# Patient Record
Sex: MALE | Race: WHITE | NOT HISPANIC OR LATINO | Employment: STUDENT | URBAN - METROPOLITAN AREA
[De-identification: names, ages, dates, MRNs, and addresses within clinical notes are randomized per-mention and may not be internally consistent; named-entity substitution may affect disease eponyms.]

---
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
## 2020-01-03 ENCOUNTER — OFFICE VISIT (OUTPATIENT)
Dept: INTERNAL MEDICINE CLINIC | Facility: CLINIC | Age: 20
End: 2020-01-03
Payer: COMMERCIAL

## 2020-01-03 VITALS
WEIGHT: 182 LBS | DIASTOLIC BLOOD PRESSURE: 78 MMHG | OXYGEN SATURATION: 96 % | SYSTOLIC BLOOD PRESSURE: 118 MMHG | HEART RATE: 76 BPM | BODY MASS INDEX: 23.36 KG/M2 | TEMPERATURE: 98.9 F | HEIGHT: 74 IN

## 2020-01-03 DIAGNOSIS — L24.9 IRRITANT CONTACT DERMATITIS, UNSPECIFIED TRIGGER: Primary | ICD-10-CM

## 2020-01-03 PROCEDURE — 3008F BODY MASS INDEX DOCD: CPT | Performed by: INTERNAL MEDICINE

## 2020-01-03 PROCEDURE — 99213 OFFICE O/P EST LOW 20 MIN: CPT | Performed by: INTERNAL MEDICINE

## 2020-01-03 RX ORDER — MOMETASONE FUROATE 1 MG/G
CREAM TOPICAL DAILY
Qty: 45 G | Refills: 0 | Status: SHIPPED | OUTPATIENT
Start: 2020-01-03 | End: 2022-06-08

## 2020-01-03 NOTE — PROGRESS NOTES
Assessment/Plan:     Diagnoses and all orders for this visit:    Irritant contact dermatitis, unspecified trigger  -     mometasone (ELOCON) 0 1 % cream; Apply topically daily          Subjective:      Patient ID: Buffy Finch is a 23 y o  male  HPI  Navjot Rain is here today with his mother with a 2 week history of rubbing his left lower eyelid, and then noticing some redness, and crusting of the skin, unresponsive to deep iris in weight min and acyclovir ointment, no alteration of vision, no eye pain, and slight improvement with applied Lubriderm, and stopping the topical preparations  His presentation today was consistent with contact dermatitis, and plan is to continue Lubriderm and avoid acyclovir and higher skin ointments and a trial of mometasone cream to be applied extremely sparingly, never to the eye itself, twice a day and call back in 3 days regarding response  The following portions of the patient's history were reviewed and updated as appropriate: allergies, current medications, past family history, past medical history, past social history, past surgical history and problem list     Review of Systems      Objective:      /78 (BP Location: Left arm, Patient Position: Sitting, Cuff Size: Standard)   Pulse 76   Temp 98 9 °F (37 2 °C) (Tympanic)   Ht 6' 2" (1 88 m)   Wt 82 6 kg (182 lb)   SpO2 96%   BMI 23 37 kg/m²      Wt Readings from Last 3 Encounters:   01/03/20 82 6 kg (182 lb) (84 %, Z= 0 98)*   04/09/19 80 3 kg (177 lb) (82 %, Z= 0 93)*   01/17/19 78 7 kg (173 lb 6 4 oz) (80 %, Z= 0 85)*     * Growth percentiles are based on CDC (Boys, 2-20 Years) data       Temp Readings from Last 3 Encounters:   01/03/20 98 9 °F (37 2 °C) (Tympanic)   04/09/19 97 9 °F (36 6 °C) (Tympanic)   01/17/19 98 7 °F (37 1 °C) (Tympanic)     BP Readings from Last 3 Encounters:   01/03/20 118/78   04/09/19 100/72   01/17/19 120/60     Pulse Readings from Last 3 Encounters:   01/03/20 76   04/09/19 71 01/17/19 71        Physical Exam    Vital signs stable with normal visual acuity and exam of both eyes demonstrates no abnormality  The left lower eyelid has a well-circumscribed area of contact dermatitis with no evidence of secondary infection  No other areas involved are noted  The area is raised, slightly indurated, slightly red and there is some exfoliation  There is well circumscribed  There is no cellulitis  Patient Active Problem List   Diagnosis    Mild exercise-induced asthma    Acne vulgaris    Environmental allergies       Current Outpatient Medications on File Prior to Visit   Medication Sig Dispense Refill    acyclovir (ZOVIRAX) 5 % ointment Apply twice daily to affected area 15 g 0    mupirocin (BACTROBAN) 2 % ointment Apply topically 2 (two) times a day for 10 days 22 g 0     No current facility-administered medications on file prior to visit

## 2021-03-10 DIAGNOSIS — Z23 ENCOUNTER FOR IMMUNIZATION: ICD-10-CM

## 2021-03-11 ENCOUNTER — IMMUNIZATIONS (OUTPATIENT)
Dept: FAMILY MEDICINE CLINIC | Facility: HOSPITAL | Age: 21
End: 2021-03-11

## 2021-03-11 DIAGNOSIS — Z23 ENCOUNTER FOR IMMUNIZATION: Primary | ICD-10-CM

## 2021-03-11 PROCEDURE — 91300 SARS-COV-2 / COVID-19 MRNA VACCINE (PFIZER-BIONTECH) 30 MCG: CPT

## 2021-03-11 PROCEDURE — 0001A SARS-COV-2 / COVID-19 MRNA VACCINE (PFIZER-BIONTECH) 30 MCG: CPT

## 2021-04-01 ENCOUNTER — IMMUNIZATIONS (OUTPATIENT)
Dept: FAMILY MEDICINE CLINIC | Facility: HOSPITAL | Age: 21
End: 2021-04-01

## 2021-04-01 DIAGNOSIS — Z23 ENCOUNTER FOR IMMUNIZATION: Primary | ICD-10-CM

## 2021-04-01 PROCEDURE — 91300 SARS-COV-2 / COVID-19 MRNA VACCINE (PFIZER-BIONTECH) 30 MCG: CPT

## 2021-04-01 PROCEDURE — 0002A SARS-COV-2 / COVID-19 MRNA VACCINE (PFIZER-BIONTECH) 30 MCG: CPT

## 2022-01-13 ENCOUNTER — APPOINTMENT (OUTPATIENT)
Dept: LAB | Facility: MEDICAL CENTER | Age: 22
End: 2022-01-13
Payer: COMMERCIAL

## 2022-01-13 DIAGNOSIS — J11.1 INFLUENZAL ACUTE UPPER RESPIRATORY INFECTION: ICD-10-CM

## 2022-01-13 DIAGNOSIS — J02.9 ACUTE PHARYNGITIS, UNSPECIFIED ETIOLOGY: ICD-10-CM

## 2022-01-13 DIAGNOSIS — U07.1 COVID-19 VIRUS INFECTION: ICD-10-CM

## 2022-01-13 LAB
ALBUMIN SERPL BCP-MCNC: 3.9 G/DL (ref 3.5–5)
ALP SERPL-CCNC: 50 U/L (ref 46–116)
ALT SERPL W P-5'-P-CCNC: 23 U/L (ref 12–78)
ANION GAP SERPL CALCULATED.3IONS-SCNC: 3 MMOL/L (ref 4–13)
AST SERPL W P-5'-P-CCNC: 11 U/L (ref 5–45)
BASOPHILS # BLD AUTO: 0.09 THOUSANDS/ΜL (ref 0–0.1)
BASOPHILS NFR BLD AUTO: 1 % (ref 0–1)
BILIRUB SERPL-MCNC: 1.1 MG/DL (ref 0.2–1)
BUN SERPL-MCNC: 10 MG/DL (ref 5–25)
CALCIUM SERPL-MCNC: 9.6 MG/DL (ref 8.3–10.1)
CHLORIDE SERPL-SCNC: 105 MMOL/L (ref 100–108)
CO2 SERPL-SCNC: 29 MMOL/L (ref 21–32)
CREAT SERPL-MCNC: 1.01 MG/DL (ref 0.6–1.3)
EOSINOPHIL # BLD AUTO: 0.13 THOUSAND/ΜL (ref 0–0.61)
EOSINOPHIL NFR BLD AUTO: 1 % (ref 0–6)
ERYTHROCYTE [DISTWIDTH] IN BLOOD BY AUTOMATED COUNT: 13 % (ref 11.6–15.1)
GFR SERPL CREATININE-BSD FRML MDRD: 105 ML/MIN/1.73SQ M
GLUCOSE P FAST SERPL-MCNC: 82 MG/DL (ref 65–99)
HCT VFR BLD AUTO: 43.7 % (ref 36.5–49.3)
HGB BLD-MCNC: 14.8 G/DL (ref 12–17)
IMM GRANULOCYTES # BLD AUTO: 0.06 THOUSAND/UL (ref 0–0.2)
IMM GRANULOCYTES NFR BLD AUTO: 1 % (ref 0–2)
LYMPHOCYTES # BLD AUTO: 2 THOUSANDS/ΜL (ref 0.6–4.47)
LYMPHOCYTES NFR BLD AUTO: 16 % (ref 14–44)
MCH RBC QN AUTO: 29.7 PG (ref 26.8–34.3)
MCHC RBC AUTO-ENTMCNC: 33.9 G/DL (ref 31.4–37.4)
MCV RBC AUTO: 88 FL (ref 82–98)
MONOCYTES # BLD AUTO: 0.58 THOUSAND/ΜL (ref 0.17–1.22)
MONOCYTES NFR BLD AUTO: 5 % (ref 4–12)
NEUTROPHILS # BLD AUTO: 10.01 THOUSANDS/ΜL (ref 1.85–7.62)
NEUTS SEG NFR BLD AUTO: 76 % (ref 43–75)
NRBC BLD AUTO-RTO: 0 /100 WBCS
PLATELET # BLD AUTO: 316 THOUSANDS/UL (ref 149–390)
PMV BLD AUTO: 8.7 FL (ref 8.9–12.7)
POTASSIUM SERPL-SCNC: 4.1 MMOL/L (ref 3.5–5.3)
PROT SERPL-MCNC: 8.2 G/DL (ref 6.4–8.2)
RBC # BLD AUTO: 4.99 MILLION/UL (ref 3.88–5.62)
SODIUM SERPL-SCNC: 137 MMOL/L (ref 136–145)
TSH SERPL DL<=0.05 MIU/L-ACNC: 2.87 UIU/ML (ref 0.36–3.74)
WBC # BLD AUTO: 12.87 THOUSAND/UL (ref 4.31–10.16)

## 2022-01-13 PROCEDURE — 80053 COMPREHEN METABOLIC PANEL: CPT

## 2022-01-13 PROCEDURE — 85025 COMPLETE CBC W/AUTO DIFF WBC: CPT

## 2022-01-13 PROCEDURE — 36415 COLL VENOUS BLD VENIPUNCTURE: CPT

## 2022-01-13 PROCEDURE — 84443 ASSAY THYROID STIM HORMONE: CPT

## 2022-06-07 ENCOUNTER — APPOINTMENT (OUTPATIENT)
Dept: LAB | Facility: MEDICAL CENTER | Age: 22
End: 2022-06-07
Payer: COMMERCIAL

## 2022-06-07 DIAGNOSIS — R53.83 OTHER FATIGUE: ICD-10-CM

## 2022-06-07 LAB
ALBUMIN SERPL BCP-MCNC: 4.6 G/DL (ref 3.5–5)
ALP SERPL-CCNC: 34 U/L (ref 46–116)
ALT SERPL W P-5'-P-CCNC: 30 U/L (ref 12–78)
ANION GAP SERPL CALCULATED.3IONS-SCNC: 3 MMOL/L (ref 4–13)
AST SERPL W P-5'-P-CCNC: 19 U/L (ref 5–45)
BASOPHILS # BLD AUTO: 0.05 THOUSANDS/ΜL (ref 0–0.1)
BASOPHILS # BLD MANUAL: 0 THOUSAND/UL (ref 0–0.1)
BASOPHILS NFR BLD AUTO: 1 % (ref 0–1)
BILIRUB SERPL-MCNC: 1.37 MG/DL (ref 0.2–1)
BUN SERPL-MCNC: 12 MG/DL (ref 5–25)
CALCIUM SERPL-MCNC: 9.8 MG/DL (ref 8.3–10.1)
CHLORIDE SERPL-SCNC: 107 MMOL/L (ref 100–108)
CO2 SERPL-SCNC: 28 MMOL/L (ref 21–32)
CREAT SERPL-MCNC: 1.1 MG/DL (ref 0.6–1.3)
EOSINOPHIL # BLD AUTO: 0.04 THOUSAND/ΜL (ref 0–0.61)
EOSINOPHIL # BLD MANUAL: 0.04 THOUSAND/UL (ref 0–0.4)
EOSINOPHIL NFR BLD AUTO: 1 % (ref 0–6)
EOSINOPHIL NFR BLD MANUAL: 1 % (ref 0–6)
ERYTHROCYTE [DISTWIDTH] IN BLOOD BY AUTOMATED COUNT: 12.6 % (ref 11.6–15.1)
FERRITIN SERPL-MCNC: 115 NG/ML (ref 8–388)
GFR SERPL CREATININE-BSD FRML MDRD: 95 ML/MIN/1.73SQ M
GLUCOSE SERPL-MCNC: 91 MG/DL (ref 65–140)
HCT VFR BLD AUTO: 42.5 % (ref 36.5–49.3)
HGB BLD-MCNC: 15.3 G/DL (ref 12–17)
IMM GRANULOCYTES # BLD AUTO: 0.02 THOUSAND/UL (ref 0–0.2)
IMM GRANULOCYTES NFR BLD AUTO: 0 % (ref 0–2)
LYMPHOCYTES # BLD AUTO: 1.74 THOUSAND/UL (ref 0.6–4.47)
LYMPHOCYTES # BLD AUTO: 1.74 THOUSANDS/ΜL (ref 0.6–4.47)
LYMPHOCYTES # BLD AUTO: 22 % (ref 14–44)
LYMPHOCYTES NFR BLD AUTO: 23 % (ref 14–44)
MCH RBC QN AUTO: 31 PG (ref 26.8–34.3)
MCHC RBC AUTO-ENTMCNC: 36 G/DL (ref 31.4–37.4)
MCV RBC AUTO: 86 FL (ref 82–98)
MONOCYTES # BLD AUTO: 0.6 THOUSAND/UL (ref 0–1.22)
MONOCYTES # BLD AUTO: 0.6 THOUSAND/ΜL (ref 0.17–1.22)
MONOCYTES NFR BLD AUTO: 8 % (ref 4–12)
MONOCYTES NFR BLD: 8 % (ref 4–12)
NEUTROPHILS # BLD AUTO: 5.3 THOUSANDS/ΜL (ref 1.85–7.62)
NEUTROPHILS # BLD MANUAL: 5.3 THOUSAND/UL (ref 1.85–7.62)
NEUTS SEG NFR BLD AUTO: 67 % (ref 43–75)
NEUTS SEG NFR BLD AUTO: 69 % (ref 43–75)
NRBC BLD AUTO-RTO: 0 /100 WBCS
PLATELET # BLD AUTO: 247 THOUSANDS/UL (ref 149–390)
PLATELET BLD QL SMEAR: ADEQUATE
PMV BLD AUTO: 9.2 FL (ref 8.9–12.7)
POTASSIUM SERPL-SCNC: 4.2 MMOL/L (ref 3.5–5.3)
PROT SERPL-MCNC: 7.7 G/DL (ref 6.4–8.2)
RBC # BLD AUTO: 4.93 MILLION/UL (ref 3.88–5.62)
RBC MORPH BLD: NORMAL
SODIUM SERPL-SCNC: 138 MMOL/L (ref 136–145)
TOTAL CELLS COUNTED SPEC: 100
WBC # BLD AUTO: 7.75 THOUSAND/UL (ref 4.31–10.16)

## 2022-06-07 PROCEDURE — 85025 COMPLETE CBC W/AUTO DIFF WBC: CPT

## 2022-06-07 PROCEDURE — 80053 COMPREHEN METABOLIC PANEL: CPT

## 2022-06-07 PROCEDURE — 82728 ASSAY OF FERRITIN: CPT

## 2022-06-07 PROCEDURE — 85027 COMPLETE CBC AUTOMATED: CPT

## 2022-06-07 PROCEDURE — 85007 BL SMEAR W/DIFF WBC COUNT: CPT

## 2022-06-08 ENCOUNTER — OFFICE VISIT (OUTPATIENT)
Dept: INTERNAL MEDICINE CLINIC | Facility: CLINIC | Age: 22
End: 2022-06-08
Payer: COMMERCIAL

## 2022-06-08 VITALS
HEIGHT: 74 IN | WEIGHT: 181 LBS | RESPIRATION RATE: 16 BRPM | HEART RATE: 81 BPM | OXYGEN SATURATION: 98 % | TEMPERATURE: 96 F | BODY MASS INDEX: 23.23 KG/M2 | DIASTOLIC BLOOD PRESSURE: 80 MMHG | SYSTOLIC BLOOD PRESSURE: 118 MMHG

## 2022-06-08 DIAGNOSIS — R40.0 DAYTIME SOMNOLENCE: ICD-10-CM

## 2022-06-08 DIAGNOSIS — R10.13 DYSPEPSIA: ICD-10-CM

## 2022-06-08 DIAGNOSIS — J34.2 ACQUIRED DEVIATED NASAL SEPTUM: Primary | ICD-10-CM

## 2022-06-08 DIAGNOSIS — R53.83 FATIGUE, UNSPECIFIED TYPE: ICD-10-CM

## 2022-06-08 DIAGNOSIS — Z91.09 ENVIRONMENTAL ALLERGIES: ICD-10-CM

## 2022-06-08 DIAGNOSIS — J45.990 MILD EXERCISE-INDUCED ASTHMA: Primary | ICD-10-CM

## 2022-06-08 PROBLEM — L70.0 ACNE VULGARIS: Status: RESOLVED | Noted: 2018-10-28 | Resolved: 2022-06-08

## 2022-06-08 PROCEDURE — 99214 OFFICE O/P EST MOD 30 MIN: CPT | Performed by: INTERNAL MEDICINE

## 2022-06-08 PROCEDURE — 1036F TOBACCO NON-USER: CPT | Performed by: INTERNAL MEDICINE

## 2022-06-08 PROCEDURE — 3008F BODY MASS INDEX DOCD: CPT | Performed by: INTERNAL MEDICINE

## 2022-06-08 RX ORDER — IPRATROPIUM BROMIDE 42 UG/1
2 SPRAY, METERED NASAL 3 TIMES DAILY PRN
COMMUNITY
Start: 2022-04-05 | End: 2022-09-20

## 2022-06-08 RX ORDER — GUAIFENESIN, PSEUDOEPHEDRINE HYDROCHLORIDE 600; 60 MG/1; MG/1
1 TABLET, EXTENDED RELEASE ORAL EVERY 12 HOURS
COMMUNITY
Start: 2022-01-13 | End: 2022-06-08

## 2022-06-08 RX ORDER — ALBUTEROL SULFATE 90 UG/1
2 AEROSOL, METERED RESPIRATORY (INHALATION) EVERY 6 HOURS PRN
COMMUNITY
Start: 2022-05-09

## 2022-06-08 NOTE — PROGRESS NOTES
INTERNAL MEDICINE OFFICE VISIT       NAME: Celeste Vazquez  AGE: 24 y o  SEX: male       : 2000        MRN: [id number]    DATE: 2022  TIME: 3:26 PM    Assessment and Plan   1  Acquired deviated nasal septum  -     Ambulatory Referral to Otolaryngology; Future    2  Fatigue, unspecified type  -     Ambulatory Referral to Sleep Medicine; Future  -     TSH, 3rd generation  -     T4, free  -     t-Transglutaminase (tTG) IgG; Future    3  Daytime somnolence  -     Ambulatory Referral to Sleep Medicine; Future    4  Dyspepsia         I will contact Compa Coyle with the results of his upcoming lab work  If unrevealing, will proceed with workup with Gastroenterology for nonulcer dyspepsia  Will also order CT of the abdomen and pelvis with oral contrast   Start Pepcid 20 mg twice daily and stop Pepto-Bismol  Chief Complaint   Multiple concerns    History of Present Illness   Celeste Vazquez is a 24y o -year-old male who is here today to reestablish care and I did see Compa Coyle several years ago  He is here today with his mother  He has completed his nelli year in college and 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  Overall his health has been good in the past     There several issues he wished to discuss  First, he notes years of inability to breathe well through his nose, constant mucus production and some sinus pressure  He has tried over-the-counter nasal sprays and antihistamines without relief  His mother recalls that he had nasal trauma around age 6 or 5 and since then has had deformity of the nose and trouble breathing  Compa Coyle confirms this  Second, over the last 2 months or so, he has noticed general fatigue and dyspepsia described as a feeling of nausea associated with eating  There is no abdominal pain, some slight anorexia, no change in bowel habits, no history of GI illness in the past   This includes no history of inflammatory bowel disease or peptic ulcers disease    There has never been any gastrointestinal surgery  We reviewed yesterday's lab work which is unrevealing and labs from over the past year which are unrevealing  There has never been a testing for celiac disease  Zhane Stewart notes that when he went to college in the beginning of last semester he lost about 30 lb because he did not like the food  His weight has since stabilized  Zhane Stewart is also no set pattern of daytime somnolence over last 6 months, and we discussed any noticeable sleep disorder  He does not feel that he wakes up frequently at night nor does he necessarily notice any problems with not feeling rested when he wakes up  He does not use drugs or drink alcohol to any significant degree, no significant caffeine, no over-the-counter medicines regularly  He did have COVID in January this year made a full recovery with minor symptoms  Discussed the possible interplay between his obvious nasal septal deviation and impaired nasal breathing, with chronic mouth breathing, and possible sleep disorder  Plan is ENT evaluation and sleep evaluation  Regarding unexplained weight loss with anorexia and dyspepsia, with fatigue, will check for celiac disease and recheck thyroid function tests as per patient request   We discussed that I anticipate need for full GI workup  Review of Systems   Review of Systems   Constitutional: Positive for appetite change, fatigue and unexpected weight change  Negative for activity change, chills and fever  Respiratory: Negative  Cardiovascular: Negative  Gastrointestinal: Positive for nausea  Negative for abdominal distention, abdominal pain, anal bleeding, blood in stool, constipation, diarrhea, rectal pain and vomiting  Endocrine: Negative for cold intolerance and heat intolerance  Psychiatric/Behavioral: Negative           Active Problem List     Patient Active Problem List   Diagnosis    Mild exercise-induced asthma    Environmental allergies    Acquired deviated nasal septum       The following portions of the patient's history were reviewed and updated as appropriate: allergies, current medications, past family history, past medical history, past social history, past surgical history, and problem list     Objective     Vitals:    06/08/22 1408   BP: 118/80   Pulse: 81   Resp: 16   Temp: (!) 96 °F (35 6 °C)   SpO2: 98%     Wt Readings from Last 3 Encounters:   06/08/22 82 1 kg (181 lb)   01/03/20 82 6 kg (182 lb) (84 %, Z= 0 98)*   04/09/19 80 3 kg (177 lb) (82 %, Z= 0 93)*     * Growth percentiles are based on CDC (Boys, 2-20 Years) data  Physical Exam     VSS, afebrile, pulse regular    Alert and Oriented in NAD    HEENT: NCAT, Pupils equal, 3 mm, EOEMI, no icterus or pallor, no iritis or conjunctivitis  No head or neck mass or adenopathy  Ears:  normal-appearing external auditory canals and tympanic membranes,     Nose:  There is obvious nasal deformity, including proximal nasal bridge protrusion, obvious septal deviation to the left, some mild nasal membranes swelling with clear mucus in both nasal passages with occlusion of left nasal passage  There is no fullness over the sinuses  Oral cavity and throat: normal dentition, no gum disease, normal mucosa, patent airway, no hemorrhages or exudates in the throat  Exam of salivary glands and ducts are normal  No submandibular or submental adenopathy  Tonsils are prominent but airways are patent  There is no tonsillitis  He does breathe through his mouth  Neck: supple, no trauma or tenderness  No JVD  Normal carotid upstroke and descent without bruits  Trachea midline without stridor  Thyroid exam normal on inspection and palpation  No spinal tenderness, full range of motion  Lymphatics: no adenopathy throughout    Chest:  No trauma or deformity, no supraclavicular adenopathy or bruit  Chest wall expansion is symmetric and normal, expiratory phase is not prolonged      Heart:  Regular rate and rhythm, normal T9-H0, no X8-D7, no clicks murmurs or rubs  Lungs:  Clear to auscultation and normal to percussion  Back:  No trauma or deformity, no CVA mass or CVA tenderness  Skin:  No rash or skin malignancy  Abdomen:  Nondistended, normal bowel sounds, soft nontender without masses bruits organomegaly  There is no hernia  There are no surgical scars  Extremities:  Arterial circulation is symmetrically normal with no clubbing cyanosis or edema  There are no varicosities, there is no calf tenderness or phlebitic findings  Joints:  No deformity, swelling, redness, tenderness or increased temperature, no instability  There are no tophi  Affect:  Normal    Neurologic:  Normal and stable gait, and otherwise no focal findings as well  Cognitive: Intact  Pertinent Laboratory/Diagnostic Studies:        Orders Placed This Encounter   Procedures    TSH, 3rd generation    T4, free    t-Transglutaminase (tTG) IgG    Ambulatory Referral to Otolaryngology    Ambulatory Referral to Sleep Medicine       ALLERGIES:  No Known Allergies    Current Medications     Current Outpatient Medications   Medication Sig Dispense Refill    albuterol (PROVENTIL HFA,VENTOLIN HFA) 90 mcg/act inhaler Inhale 2 puffs every 6 (six) hours as needed      ipratropium (ATROVENT) 0 06 % nasal spray 2 sprays into each nostril 3 (three) times a day as needed       No current facility-administered medications for this visit           Yan Irvin MD

## 2022-06-16 ENCOUNTER — LAB (OUTPATIENT)
Dept: LAB | Facility: MEDICAL CENTER | Age: 22
End: 2022-06-16
Payer: COMMERCIAL

## 2022-06-16 DIAGNOSIS — R53.83 FATIGUE, UNSPECIFIED TYPE: ICD-10-CM

## 2022-06-16 LAB
T4 FREE SERPL-MCNC: 0.85 NG/DL (ref 0.76–1.46)
TSH SERPL DL<=0.05 MIU/L-ACNC: 2.41 UIU/ML (ref 0.45–4.5)

## 2022-06-16 PROCEDURE — 84439 ASSAY OF FREE THYROXINE: CPT | Performed by: INTERNAL MEDICINE

## 2022-06-16 PROCEDURE — 86364 TISS TRNSGLTMNASE EA IG CLAS: CPT

## 2022-06-16 PROCEDURE — 84443 ASSAY THYROID STIM HORMONE: CPT | Performed by: INTERNAL MEDICINE

## 2022-06-16 PROCEDURE — 36415 COLL VENOUS BLD VENIPUNCTURE: CPT | Performed by: INTERNAL MEDICINE

## 2022-06-18 LAB — TTG IGG SER-ACNC: <2 U/ML (ref 0–5)

## 2022-06-20 NOTE — RESULT ENCOUNTER NOTE
Ar Ham,  Your labs are unremarkable overall  The low anion gap and alkaline phosphatase are due to problems with our chemistry analyzer  The next step is to see the Sleep Specialist  I will review the note from the Specialist to follow along     Dr Murrell Stamp

## 2022-08-17 ENCOUNTER — OFFICE VISIT (OUTPATIENT)
Dept: SLEEP CENTER | Facility: CLINIC | Age: 22
End: 2022-08-17
Payer: COMMERCIAL

## 2022-08-17 VITALS
SYSTOLIC BLOOD PRESSURE: 126 MMHG | BODY MASS INDEX: 24.64 KG/M2 | HEART RATE: 82 BPM | DIASTOLIC BLOOD PRESSURE: 57 MMHG | HEIGHT: 74 IN | WEIGHT: 192 LBS

## 2022-08-17 DIAGNOSIS — R35.1 NOCTURIA: ICD-10-CM

## 2022-08-17 DIAGNOSIS — G47.33 OBSTRUCTIVE SLEEP APNEA: Primary | ICD-10-CM

## 2022-08-17 DIAGNOSIS — J35.1 CHRONIC TONSILLAR HYPERTROPHY: ICD-10-CM

## 2022-08-17 DIAGNOSIS — G47.00 INSOMNIA, UNSPECIFIED TYPE: ICD-10-CM

## 2022-08-17 DIAGNOSIS — G47.19 EXCESSIVE DAYTIME SLEEPINESS: ICD-10-CM

## 2022-08-17 DIAGNOSIS — R53.83 FATIGUE, UNSPECIFIED TYPE: ICD-10-CM

## 2022-08-17 PROCEDURE — 99244 OFF/OP CNSLTJ NEW/EST MOD 40: CPT | Performed by: NURSE PRACTITIONER

## 2022-08-17 NOTE — PATIENT INSTRUCTIONS
Schedule home sleep study  Follow up to review results and plan of treatment      Nursing Support:  When: Monday through Friday 7A-5PM except holidays  Where: Our direct line is [phone]  If you are having a true emergency please call 911  In the event that the line is busy or it is after hours please leave a voice message and we will return your call  Please speak clearly, leaving your full name, birth date, best number to reach you and the reason for your call  Medication refills: We will need the name of the medication, the dosage, the ordering provider, whether you get a 30 or 90 day refill, and the pharmacy name and address  Medications will be ordered by the provider only  Nurses cannot call in prescriptions  Please allow 7 days for medication refills  Physician requested updates: If your provider requested that you call with an update after starting medication, please be ready to provide us the medication and dosage, what time you take your medication, the time you attempt to fall asleep, time you fall asleep, when you wake up, and what time you get out of bed  Sleep Study Results: We will contact you with sleep study results and/or next steps after the physician has reviewed your testing

## 2022-08-17 NOTE — PROGRESS NOTES
Consultation - [address] [facility], 2000, MRN: [id number]    8/17/2022        Reason for Consult / Principal Problem:  Excessive daytime sleepiness  Fatigue  Evaluation of possible Obstructive Sleep Apnea       Thank you for the opportunity of participating in the evaluation and care of this patient [facility]  Subjective:     HPI: Manuela Simental is a 24y o  year old male  He presents with his mother for a consultation regarding excessive daytime sleepiness and fatigue  He has noticed an increase and worsening of daytime sleepiness and fatigue over the past year  He reports that he had Covid-19 in fall of 2021  He then felt that he may have had Covid-19 again in the spring, however, it was not confirmed by testing  He also had influenza A in January 2022  He noticed symptoms of feeling tired and not as refreshed by sleep throughout the spring months  He discussed symptoms with his PCP  He was referred to ENT for evaluation of a deviated septum  He was found to have enlarged tonsils and adenoids  He was advised to have surgery for excision  He is planning to have further consultation with Dr Gwendolyn Celis, regarding surgical excision  He has some mild, exercise induced asthma  History is positive for a head injury with sutures at age 10-6 when he ran into a pillar  Comorbid conditions:  asthma  Review of Systems      Genitourinary need to urinate more than twice a night   Cardiology none   Gastrointestinal none   Neurology none   Constitutional fatigue   Integumentary none   Psychiatry none   Musculoskeletal none   Pulmonary snoring   ENT none   Endocrine frequent urination   Hematological none       Sleep Study Results:  No prior sleep study    Employment:  Full time college student at [employer]  Will begin senior year this fall      Sleep Schedule:       Bedtime:  11:30pm-12:30am on week days, 2:00am on weekends      Latency:  Within 5 minutes      Wakeup time:  Weekdays 8:45-9:00am, weekends 9:45-10:00am    Awakenings:       Frequency:  2 times per night      Causes: For urination      Duration:  Returns to sleep without difficulty    Daytime Sleepiness / Inappropriate Sleep:       Most severe: All day       Naps :  He does not take naps      Inappropriate drowsiness / sleep:  He is able to maintain wakefulness in classes  He feels drowsy with sedentary activities but is able to maintain wakefulness  Snoring:  He snores lightly    Apnea: No witnessed apnea    Change in Weight:  He lost 30 lbs this past year, but has gained 20 lbs back - attributes to stress and dislike of food options    Restless Leg Syndrome:  no clinical symptoms consistent with this diagnosis     Other Complaints:  No reports of sleep walking, sleep talking, sleep paralysis or hallucinations surrounding sleep  He does not awaken with headaches  His dentist has mentioned evidence of bruxism  No symptoms concerning for cataplexy  Social History:      Caffeine:  2 cups of espresso, 1-2 energy drinks throughout the day, he has used pre-workout in the past       Tobacco:   reports that he has never smoked  He has never used smokeless tobacco      E-cig/Vaping:    E-Cigarette/Vaping    E-Cigarette Use Never User       E-Cigarette/Vaping Substances    Nicotine No     THC No     CBD No     Flavoring No     Other No     Unknown No          Alcohol:   reports no history of alcohol use  Social - 1-2 beers after 5:00pm      Drugs:   reports no history of drug use            none       The review of systems and following portions of the patient's history were reviewed and updated as appropriate: allergies, current medications, past family history, past medical history, past social history, past surgical history and problem list         Objective:       Vitals:    08/17/22 1250   BP: 126/57   BP Location: Left arm Patient Position: Sitting   Cuff Size: Adult   Pulse: 82   Weight: 87 1 kg (192 lb)   Height: 6' 2" (1 88 m)     Body mass index is 24 65 kg/m²  Neck Circumference: 15 25  [place] Sleepiness Scale: Total score: 12      Current Outpatient Medications:     albuterol (PROVENTIL HFA,VENTOLIN HFA) 90 mcg/act inhaler, Inhale 2 puffs every 6 (six) hours as needed, Disp: , Rfl:     fluticasone (FLONASE) 50 mcg/act nasal spray, 1 spray into each nostril 2 (two) times a day, Disp: 16 g, Rfl: 3    ipratropium (ATROVENT) 0 06 % nasal spray, 2 sprays into each nostril 3 (three) times a day as needed (Patient not taking: Reported on 8/17/2022), Disp: , Rfl:     methylprednisolone (MEDROL) 4 mg tablet, 6,  5, 4,  3, 2, 1 (Patient not taking: Reported on 8/17/2022), Disp: 21 tablet, Rfl: 1    Physical Exam  General Appearance:   Alert, cooperative, no distress, appears stated age, thin build     Head:   Normocephalic, without obvious abnormality, atraumatic     Eyes:   PERRL, conjunctiva/corneas clear          Nose:  Nares normal, septum midline, mucosa normal, no drainage or sinus tenderness           Throat:  Lips, teeth and gums normal; tongue normal in size and midline in position; mucosa moist with crowded oropharyngeal opening, uvula normal, tonsils +3/4 bilaterally with crypting, Mallampati class 3       Neck:  Supple, symmetrical, trachea midline, no adenopathy; no thyromegaly noted, no carotid bruit or JVD     Lungs:      Clear to auscultation bilaterally, respirations unlabored     Heart:   Regular rate and rhythm, S1 and S2 normal, no murmur, rub or gallop       Extremities:  Extremities normal, atraumatic, no cyanosis or edema       Skin:  Skin color, texture, turgor normal, no rashes or lesions       Neurologic:  No focal deficits noted  ASSESSMENT / PLAN     1  Obstructive sleep apnea  Home Study   2  Fatigue, unspecified type  Ambulatory Referral to Sleep Medicine    Home Study   3   Excessive daytime sleepiness  Ambulatory Referral to Sleep Medicine    Home Study   4  Insomnia, unspecified type  Home Study    sleep maintenance insomnia   5  Nocturia  Home Study   6  Chronic tonsillar hypertrophy           Counseling / Coordination of Care  Total clinic time spent today 60 minutes  Greater than 50% of total time was spent with the patient and / or family counseling and / or coordination of care  A description of the counseling / coordination of care:     diagnostic results, instructions for management, risk factor reductions, prognosis, patient and family education, impressions, risks and benefits of treatment options and importance of compliance with treatment    Today we discussed the anatomy and physiology of the upper airway  I pointed out how changes in this region can result in both snoring and abnormal breathing events including apneas and hypopneas  I explained the most common co-morbidities of untreated sleep apnea  After this we talked about some forms of treatment including application of positive airway pressure, mandibular advancement devices and surgery  We discussed that symptoms may improve with removal of tonsils and adenoids  In order to evaluate the possibility of Obstructive Sleep Apnea as a cause of the patient's symptoms, a home sleep study will be completed to identify the presence or absence of abnormal nocturnal breathing  If significant abnormal nocturnal breathing is detected, nasal CPAP will be titrated to find the optimum pressure needed to maintain upper airway patency during sleep  This may be accomplished using APAP or may require an in lab titration study  Following testing, the patient will return to the Sleep [address] [place] [address] to review results of testing and plan of treatment  If symptoms persist, further evaluation may be needed regarding other possible etiology of symptoms        The following instructions have been given to the patient today:    Patient Instructions   1  Schedule home sleep study  2  Follow up to review results and plan of treatment      Nursing Support:  When: Monday through Friday 7A-5PM except holidays  Where: Our direct line is [phone]  If you are having a true emergency please call 911  In the event that the line is busy or it is after hours please leave a voice message and we will return your call  Please speak clearly, leaving your full name, birth date, best number to reach you and the reason for your call  Medication refills: We will need the name of the medication, the dosage, the ordering provider, whether you get a 30 or 90 day refill, and the pharmacy name and address  Medications will be ordered by the provider only  Nurses cannot call in prescriptions  Please allow 7 days for medication refills  Physician requested updates: If your provider requested that you call with an update after starting medication, please be ready to provide us the medication and dosage, what time you take your medication, the time you attempt to fall asleep, time you fall asleep, when you wake up, and what time you get out of bed  Sleep Study Results: We will contact you with sleep study results and/or next steps after the physician has reviewed your testing  [facility]      Portions of the record may have been created with voice recognition software  Occasional wrong word or "sound a like" substitutions may have occurred due to the inherent limitations of voice recognition software  Read the chart carefully and recognize, using context, where substitutions have occurred

## 2022-09-20 DIAGNOSIS — B96.89 ACUTE BACTERIAL BRONCHITIS: Primary | ICD-10-CM

## 2022-09-20 DIAGNOSIS — J20.8 ACUTE BACTERIAL BRONCHITIS: Primary | ICD-10-CM

## 2022-09-20 RX ORDER — AZITHROMYCIN 250 MG/1
TABLET, FILM COATED ORAL
Qty: 6 TABLET | Refills: 0 | Status: SHIPPED | OUTPATIENT
Start: 2022-09-20 | End: 2022-09-24

## 2022-09-28 ENCOUNTER — OFFICE VISIT (OUTPATIENT)
Dept: INTERNAL MEDICINE CLINIC | Facility: CLINIC | Age: 22
End: 2022-09-28
Payer: COMMERCIAL

## 2022-09-28 VITALS
SYSTOLIC BLOOD PRESSURE: 152 MMHG | DIASTOLIC BLOOD PRESSURE: 98 MMHG | HEART RATE: 79 BPM | OXYGEN SATURATION: 97 % | WEIGHT: 183.6 LBS | HEIGHT: 74 IN | RESPIRATION RATE: 16 BRPM | BODY MASS INDEX: 23.56 KG/M2 | TEMPERATURE: 97.6 F

## 2022-09-28 DIAGNOSIS — K29.70 VIRAL GASTRITIS: ICD-10-CM

## 2022-09-28 DIAGNOSIS — R04.0 EPISTAXIS: ICD-10-CM

## 2022-09-28 DIAGNOSIS — J06.9 VIRAL URI: Primary | ICD-10-CM

## 2022-09-28 PROCEDURE — 99213 OFFICE O/P EST LOW 20 MIN: CPT | Performed by: INTERNAL MEDICINE

## 2022-09-28 RX ORDER — IBUPROFEN 200 MG
TABLET ORAL EVERY 6 HOURS PRN
COMMUNITY

## 2022-09-28 NOTE — PROGRESS NOTES
INTERNAL MEDICINE OFFICE VISIT       NAME: Timbo Fishman  AGE: 25 y o  SEX: male       : 2000        MRN: [id number]    DATE: 2022  TIME: 4:00 PM    Assessment and Plan   1  Viral URI    2  Epistaxis    3  Viral gastritis         Follow-up as needed        Chief Complaint   Fever, vomiting and congestion    History of Present Illness   Timbo Fishman is a 25y o -year-old male who is home from college today as he developed a low-grade fever to 100 5 yesterday, nausea and vomiting, nasal congestion and today is starting to feel better  However, with his nasal congestion has come some increased clearance of nasal secretions with some passage of blood with small clots and mucus  He brought in a picture what he has been expelling  Anai Thakkar saw he ENT most recently 2 weeks ago and has severe obstructive nasal septal deviation, chronic tonsillar hypertrophy and enlarged adenoids and turbinates  He will be undergoing elective surgery in December to correct these problems  He began taking an Advil containing cold and cough medicine yesterday and we discussed that Advil, naproxen and aspirin should be avoided as they can increase bleeding  We discussed how friable his adenoids might be, and that they will easily bleed with irritation  He has rapidly improved and his nausea and vomiting have resolved, there is no diarrhea or abdominal pain, fevers resolved and his nasal congestion does persist   Direct he is also on day 8 of 10 of Augmentin for bacterial sinusitis  He was evaluated at his health center 8 days ago and did respond to therapy  He is enjoying a 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 has a job in [place] after he graduates this coming spring      Review of Systems   Review of Systems as above    Active Problem List     Patient Active Problem List   Diagnosis    Mild exercise-induced asthma    Environmental allergies    Acquired deviated nasal septum    Insomnia  Excessive daytime sleepiness    Fatigue    Obstructive sleep apnea    Nocturia    Chronic tonsillar hypertrophy       The following portions of the patient's history were reviewed and updated as appropriate: allergies, current medications, past family history, past medical history, past social history, past surgical history, and problem list     Objective     Vitals:    09/28/22 1521   BP: 152/98   Pulse: 79   Resp: 16   Temp: 97 6 °F (36 4 °C)   SpO2: 97%     Wt Readings from Last 3 Encounters:   09/28/22 83 3 kg (183 lb 9 6 oz)   09/09/22 83 9 kg (185 lb)   08/17/22 87 1 kg (192 lb)       Physical Exam     Vital signs stable, afebrile, well hydrated, alert and oriented in no distress  ENT exam:  He is a very nasal sounding voice, slight fullness over the sinuses, no conjunctivitis, nasal passages notable for significant nasal membrane edema, no visible clots or active epistaxis anteriorly or posteriorly, with very little air movement  Oral cavity throat notable for very large tonsils, no hemorrhages or exudates  There is mild anterior cervical adenopathy  Neck is supple  Lungs are clear and cardiac exam is normal   Skin without rash  ALLERGIES:  No Known Allergies    Current Medications     Current Outpatient Medications   Medication Sig Dispense Refill    albuterol (PROVENTIL HFA,VENTOLIN HFA) 90 mcg/act inhaler Inhale 2 puffs every 6 (six) hours as needed      ibuprofen (MOTRIN) 200 mg tablet Take by mouth every 6 (six) hours as needed for mild pain Take 3 tabs q6 hours as needed      Pseudoephedrine-APAP-DM (DAYQUIL PO) Take by mouth       No current facility-administered medications for this visit           [facility]

## 2022-09-28 NOTE — LETTER
September 28, 2022     Patient: Brian Ling  YOB: 2000  Date of Visit: 9/28/2022      To Whom it May Concern:    Brian Ling is under my professional care  Domo aGlindo was seen in my office on 9/28/2022  Domo Galindo is excused from September 27 through September 29, 2022  Domo Galindo may return to school on September 30, 2022       If you have any questions or concerns, please don't hesitate to call           Sincerely,          Jennyfer Butler MD        CC: Brian Ling

## 2022-12-06 ENCOUNTER — HOSPITAL ENCOUNTER (OUTPATIENT)
Dept: RADIOLOGY | Facility: HOSPITAL | Age: 22
Discharge: HOME | End: 2022-12-06

## 2022-12-06 ENCOUNTER — OFFICE VISIT (OUTPATIENT)
Dept: INTERNAL MEDICINE CLINIC | Facility: CLINIC | Age: 22
End: 2022-12-06

## 2022-12-06 VITALS
DIASTOLIC BLOOD PRESSURE: 70 MMHG | HEIGHT: 74 IN | SYSTOLIC BLOOD PRESSURE: 120 MMHG | OXYGEN SATURATION: 97 % | WEIGHT: 188 LBS | BODY MASS INDEX: 24.13 KG/M2 | HEART RATE: 83 BPM | TEMPERATURE: 97.5 F | RESPIRATION RATE: 16 BRPM

## 2022-12-06 DIAGNOSIS — R06.02 SHORTNESS OF BREATH: ICD-10-CM

## 2022-12-06 DIAGNOSIS — R06.02 SHORTNESS OF BREATH: Primary | ICD-10-CM

## 2022-12-06 DIAGNOSIS — J06.9 UPPER RESPIRATORY TRACT INFECTION, UNSPECIFIED TYPE: ICD-10-CM

## 2022-12-06 RX ORDER — DOXYCYCLINE HYCLATE 100 MG/1
100 CAPSULE ORAL EVERY 12 HOURS SCHEDULED
Qty: 10 CAPSULE | Refills: 0 | Status: SHIPPED | OUTPATIENT
Start: 2022-12-06 | End: 2022-12-11

## 2022-12-06 NOTE — PROGRESS NOTES
Assessment and Plan:       1  Shortness of breath  -     doxycycline hyclate (VIBRAMYCIN) 100 mg capsule; Take 1 capsule (100 mg total) by mouth every 12 (twelve) hours for 5 days  -     XR chest pa & lateral; Future; Expected date: 12/06/2022    2  Upper respiratory tract infection, unspecified type  -     doxycycline hyclate (VIBRAMYCIN) 100 mg capsule; Take 1 capsule (100 mg total) by mouth every 12 (twelve) hours for 5 days         Subjective:     Patient ID: Jo-Ann Mckinney is a pleasant  25 y o  male who presents today for evaluation of upper respiratory symptoms and cough  Patient notes that approximately 5 days ago started experiencing what he describes as increased phlegm in his throat which he attributes to postnasal drip  Around that time he also started experiencing congestion, “brain fog” and fatigue  He notes that he also had chills, however been he measured his temperature at home, it was normal  Two days ago he started experiencing shortness of breath and has felt like he has to cough due to increased amounts of phlegm in his airway  Mya Carty has a tonsillectomy and adenoidectomy scheduled for 12/15 and he and his mother who is present today are concerned about whether or not he would be clear to proceed with surgery given his current symptoms  We discussed that although symptoms likely have viral etiology, given the rhonchi appreciated on auscultation, it would be beneficial to perform a chest Xray and start Doxycycline for 5 days  Mya Carty and his mother are in agreement with the plan  Head feels cloudy/foggy         Patient has no other complaints at this time  Review of Systems   Constitutional: Positive for chills and fatigue  Negative for activity change, appetite change, fever and unexpected weight change  HENT: Positive for congestion, postnasal drip and rhinorrhea  Negative for sinus pressure, trouble swallowing and voice change      Eyes: Negative for photophobia and visual disturbance  Respiratory: Positive for cough and shortness of breath  Negative for chest tightness and wheezing  Cardiovascular: Negative for chest pain, palpitations and leg swelling  Gastrointestinal: Negative for abdominal distention, abdominal pain, constipation, diarrhea, nausea and vomiting  Endocrine: Negative for polydipsia and polyuria  Genitourinary: Negative for difficulty urinating, dysuria, frequency and hematuria  Musculoskeletal: Negative for arthralgias, back pain and myalgias  Skin: Negative for color change, pallor and rash  Neurological: Negative for dizziness, facial asymmetry, weakness, light-headedness, numbness and headaches  Psychiatric/Behavioral: Negative for agitation, behavioral problems, confusion and dysphoric mood  All other systems reviewed and are negative  Patient Active Problem List   Diagnosis   • Mild exercise-induced asthma   • Environmental allergies   • Acquired deviated nasal septum   • Insomnia   • Excessive daytime sleepiness   • Fatigue   • Obstructive sleep apnea   • Nocturia   • Chronic tonsillar hypertrophy        Current Outpatient Medications   Medication Sig Dispense Refill   • albuterol (PROVENTIL HFA,VENTOLIN HFA) 90 mcg/act inhaler Inhale 2 puffs every 6 (six) hours as needed     • doxycycline hyclate (VIBRAMYCIN) 100 mg capsule Take 1 capsule (100 mg total) by mouth every 12 (twelve) hours for 5 days 10 capsule 0   • ibuprofen (MOTRIN) 200 mg tablet Take by mouth every 6 (six) hours as needed for mild pain Take 3 tabs q6 hours as needed     • Pseudoephedrine-APAP-DM (DAYQUIL PO) Take by mouth       No current facility-administered medications for this visit          No Known Allergies     The following portions of the patient's history were reviewed and updated as appropriate: allergies, current medications, past family history, past medical history, past social history, past surgical history and problem list           Objective:    BP 120/70   Pulse 83   Temp 97 5 °F (36 4 °C)   Resp 16   Ht 6' 2" (1 88 m)   Wt 85 3 kg (188 lb)   SpO2 97%   BMI 24 14 kg/m²      Body mass index is 24 14 kg/m²  Physical Exam  Vitals and nursing note reviewed  Constitutional:       General: He is not in acute distress  Appearance: Normal appearance  He is not ill-appearing, toxic-appearing or diaphoretic  HENT:      Head: Normocephalic and atraumatic  Nose: Nose normal       Mouth/Throat:      Mouth: Mucous membranes are moist       Pharynx: Oropharynx is clear  Posterior oropharyngeal erythema (Bilateral tonsillar erythema) present  Eyes:      General: No scleral icterus  Right eye: No discharge  Left eye: No discharge  Extraocular Movements: Extraocular movements intact  Conjunctiva/sclera: Conjunctivae normal    Cardiovascular:      Rate and Rhythm: Normal rate and regular rhythm  Pulses: Normal pulses  Heart sounds: Normal heart sounds  No murmur heard  Pulmonary:      Effort: Pulmonary effort is normal  No respiratory distress  Breath sounds: Wheezing and rhonchi present  No rales  Abdominal:      General: Abdomen is flat  Bowel sounds are normal  There is no distension  Palpations: Abdomen is soft  Musculoskeletal:      Cervical back: Normal range of motion and neck supple  Right lower leg: No edema  Left lower leg: No edema  Skin:     General: Skin is warm and dry  Coloration: Skin is not jaundiced or pale  Neurological:      Mental Status: He is alert and oriented to person, place, and time  Mental status is at baseline  Psychiatric:         Mood and Affect: Mood normal          Behavior: Behavior normal          Thought Content:  Thought content normal          Judgment: Judgment normal

## 2022-12-13 ENCOUNTER — ANESTHESIA EVENT (OUTPATIENT)
Dept: PERIOP | Facility: HOSPITAL | Age: 22
End: 2022-12-13

## 2022-12-13 NOTE — PRE-PROCEDURE INSTRUCTIONS
Pre-Surgery Instructions:   Medication Instructions   • albuterol (PROVENTIL HFA,VENTOLIN HFA) 90 mcg/act inhaler Uses PRN- OK to take day of surgery   • ibuprofen (MOTRIN) 200 mg tablet Has held 7 days prior   • Pseudoephedrine-APAP-DM (DAYQUIL PO) Uses PRN- OK to take day of surgery   Pre-op medication, and showering instructions with antibacteral soap reviewed  Pt  Verbalized understanding of current visitor restrictions  Pt  Verbalized an understanding of all instructions reviewed and offers no concerns at this time  Instructed to avoid all ASA/NSAIDs and OTC Vit/Supp from now until after surgery per anesthesia guidelines   Tylenol ok prn

## 2022-12-14 NOTE — ANESTHESIA PREPROCEDURE EVALUATION
Procedure:  TONSILLECTOMY & ADENOIDECTOMY (Throat)  TURBINECTOMY (Nose)    Relevant Problems   CARDIO (within normal limits)      ENDO (within normal limits)      GI/HEPATIC (within normal limits)      /RENAL (within normal limits)      MUSCULOSKELETAL (within normal limits)      NEURO/PSYCH (within normal limits)      PULMONARY   (+) Mild exercise-induced asthma (mild )   (+) Obstructive sleep apnea   (-) Smoking      Respiratory   (+) Acquired deviated nasal septum      Other   (+) Chronic tonsillar hypertrophy   (+) Environmental allergies   (+) Excessive daytime sleepiness   (+) Fatigue   (+) Insomnia        Physical Exam    Airway    Mallampati score: II  TM Distance: >3 FB  Neck ROM: full     Dental       Cardiovascular  Cardiovascular exam normal    Pulmonary  Pulmonary exam normal     Other Findings        Anesthesia Plan  ASA Score- 2     Anesthesia Type- general with ASA Monitors  Additional Monitors:   Airway Plan: ETT  Plan Factors-Exercise tolerance (METS): >4 METS  Chart reviewed  Existing labs reviewed  Patient summary reviewed  Patient is not a current smoker  Patient not instructed to abstain from smoking on day of procedure  Patient did not smoke on day of surgery  Induction- intravenous  Postoperative Plan- Plan for postoperative opioid use  Informed Consent- Anesthetic plan and risks discussed with patient, mother and father  I personally reviewed this patient with the CRNA  Discussed and agreed on the Anesthesia Plan with the CRNA  Josh Pruett           No results found for: HGBA1C    Lab Results   Component Value Date    K 4 2 06/07/2022     06/07/2022    CO2 28 06/07/2022    BUN 12 06/07/2022    CREATININE 1 10 06/07/2022    GLUF 82 01/13/2022    CALCIUM 9 8 06/07/2022    AST 19 06/07/2022    ALT 30 06/07/2022    ALKPHOS 34 (L) 06/07/2022    EGFR 95 06/07/2022       Lab Results   Component Value Date    WBC 7 75 06/07/2022    HGB 15 3 06/07/2022    HCT 42 5 06/07/2022    MCV 86 06/07/2022     06/07/2022

## 2022-12-15 ENCOUNTER — HOSPITAL ENCOUNTER (OUTPATIENT)
Facility: HOSPITAL | Age: 22
Setting detail: OUTPATIENT SURGERY
Discharge: HOME | End: 2022-12-15
Attending: OTOLARYNGOLOGY | Admitting: OTOLARYNGOLOGY

## 2022-12-15 ENCOUNTER — ANESTHESIA (OUTPATIENT)
Dept: PERIOP | Facility: HOSPITAL | Age: 22
End: 2022-12-15

## 2022-12-15 VITALS
SYSTOLIC BLOOD PRESSURE: 135 MMHG | BODY MASS INDEX: 24.13 KG/M2 | RESPIRATION RATE: 20 BRPM | WEIGHT: 188 LBS | TEMPERATURE: 98.6 F | HEIGHT: 74 IN | DIASTOLIC BLOOD PRESSURE: 85 MMHG | HEART RATE: 60 BPM | OXYGEN SATURATION: 99 %

## 2022-12-15 DIAGNOSIS — J34.3 NASAL TURBINATE HYPERTROPHY: ICD-10-CM

## 2022-12-15 DIAGNOSIS — J35.1 CHRONIC TONSILLAR HYPERTROPHY: Primary | ICD-10-CM

## 2022-12-15 RX ORDER — FENTANYL CITRATE/PF 50 MCG/ML
50 SYRINGE (ML) INJECTION
Status: DISCONTINUED | OUTPATIENT
Start: 2022-12-15 | End: 2022-12-15 | Stop reason: HOSPADM

## 2022-12-15 RX ORDER — SODIUM CHLORIDE, SODIUM LACTATE, POTASSIUM CHLORIDE, CALCIUM CHLORIDE 600; 310; 30; 20 MG/100ML; MG/100ML; MG/100ML; MG/100ML
50 INJECTION, SOLUTION INTRAVENOUS CONTINUOUS
Status: CANCELLED | OUTPATIENT
Start: 2022-12-15

## 2022-12-15 RX ORDER — IBUPROFEN 200 MG
600 TABLET ORAL EVERY 6 HOURS PRN
Qty: 60 TABLET | Refills: 1 | Status: SHIPPED | OUTPATIENT
Start: 2022-12-15 | End: 2023-03-15

## 2022-12-15 RX ORDER — ONDANSETRON 2 MG/ML
4 INJECTION INTRAMUSCULAR; INTRAVENOUS ONCE AS NEEDED
Status: DISCONTINUED | OUTPATIENT
Start: 2022-12-15 | End: 2022-12-15 | Stop reason: HOSPADM

## 2022-12-15 RX ORDER — HYDROMORPHONE HCL/PF 1 MG/ML
SYRINGE (ML) INJECTION AS NEEDED
Status: DISCONTINUED | OUTPATIENT
Start: 2022-12-15 | End: 2022-12-15

## 2022-12-15 RX ORDER — DEXAMETHASONE SODIUM PHOSPHATE 10 MG/ML
INJECTION, SOLUTION INTRAMUSCULAR; INTRAVENOUS AS NEEDED
Status: DISCONTINUED | OUTPATIENT
Start: 2022-12-15 | End: 2022-12-15

## 2022-12-15 RX ORDER — ROCURONIUM BROMIDE 10 MG/ML
INJECTION, SOLUTION INTRAVENOUS AS NEEDED
Status: DISCONTINUED | OUTPATIENT
Start: 2022-12-15 | End: 2022-12-15

## 2022-12-15 RX ORDER — SODIUM CHLORIDE 9 MG/ML
INJECTION, SOLUTION INTRAVENOUS AS NEEDED
Status: DISCONTINUED | OUTPATIENT
Start: 2022-12-15 | End: 2022-12-15 | Stop reason: HOSPADM

## 2022-12-15 RX ORDER — FENTANYL CITRATE 50 UG/ML
INJECTION, SOLUTION INTRAMUSCULAR; INTRAVENOUS AS NEEDED
Status: DISCONTINUED | OUTPATIENT
Start: 2022-12-15 | End: 2022-12-15

## 2022-12-15 RX ORDER — HYDROMORPHONE HCL/PF 1 MG/ML
0.5 SYRINGE (ML) INJECTION
Status: DISCONTINUED | OUTPATIENT
Start: 2022-12-15 | End: 2022-12-15 | Stop reason: HOSPADM

## 2022-12-15 RX ORDER — MIDAZOLAM HYDROCHLORIDE 2 MG/2ML
INJECTION, SOLUTION INTRAMUSCULAR; INTRAVENOUS AS NEEDED
Status: DISCONTINUED | OUTPATIENT
Start: 2022-12-15 | End: 2022-12-15

## 2022-12-15 RX ORDER — SODIUM CHLORIDE, SODIUM LACTATE, POTASSIUM CHLORIDE, CALCIUM CHLORIDE 600; 310; 30; 20 MG/100ML; MG/100ML; MG/100ML; MG/100ML
50 INJECTION, SOLUTION INTRAVENOUS CONTINUOUS
Status: DISCONTINUED | OUTPATIENT
Start: 2022-12-15 | End: 2022-12-15 | Stop reason: HOSPADM

## 2022-12-15 RX ORDER — OXYMETAZOLINE HYDROCHLORIDE 0.05 G/100ML
SPRAY NASAL AS NEEDED
Status: DISCONTINUED | OUTPATIENT
Start: 2022-12-15 | End: 2022-12-15 | Stop reason: HOSPADM

## 2022-12-15 RX ORDER — OXYCODONE HYDROCHLORIDE 5 MG/1
5 TABLET ORAL EVERY 4 HOURS PRN
Qty: 10 TABLET | Refills: 0 | Status: SHIPPED | OUTPATIENT
Start: 2022-12-15 | End: 2022-12-25

## 2022-12-15 RX ORDER — ONDANSETRON 2 MG/ML
INJECTION INTRAMUSCULAR; INTRAVENOUS AS NEEDED
Status: DISCONTINUED | OUTPATIENT
Start: 2022-12-15 | End: 2022-12-15

## 2022-12-15 RX ORDER — ACETAMINOPHEN 325 MG/1
650 TABLET ORAL EVERY 6 HOURS PRN
Status: DISCONTINUED | OUTPATIENT
Start: 2022-12-15 | End: 2022-12-15 | Stop reason: HOSPADM

## 2022-12-15 RX ORDER — DEXMEDETOMIDINE HYDROCHLORIDE 100 UG/ML
INJECTION, SOLUTION INTRAVENOUS AS NEEDED
Status: DISCONTINUED | OUTPATIENT
Start: 2022-12-15 | End: 2022-12-15

## 2022-12-15 RX ORDER — PROPOFOL 10 MG/ML
INJECTION, EMULSION INTRAVENOUS AS NEEDED
Status: DISCONTINUED | OUTPATIENT
Start: 2022-12-15 | End: 2022-12-15

## 2022-12-15 RX ORDER — OXYCODONE HCL 5 MG/5 ML
5 SOLUTION, ORAL ORAL EVERY 4 HOURS PRN
Status: DISCONTINUED | OUTPATIENT
Start: 2022-12-15 | End: 2022-12-15 | Stop reason: HOSPADM

## 2022-12-15 RX ADMIN — PROPOFOL 200 MG: 10 INJECTION, EMULSION INTRAVENOUS at 09:44

## 2022-12-15 RX ADMIN — MIDAZOLAM 2 MG: 1 INJECTION INTRAMUSCULAR; INTRAVENOUS at 09:37

## 2022-12-15 RX ADMIN — ONDANSETRON 4 MG: 2 INJECTION INTRAMUSCULAR; INTRAVENOUS at 09:54

## 2022-12-15 RX ADMIN — DEXMEDETOMIDINE HYDROCHLORIDE 8 MCG: 100 INJECTION, SOLUTION INTRAVENOUS at 10:35

## 2022-12-15 RX ADMIN — SODIUM CHLORIDE, SODIUM LACTATE, POTASSIUM CHLORIDE, AND CALCIUM CHLORIDE: .6; .31; .03; .02 INJECTION, SOLUTION INTRAVENOUS at 09:18

## 2022-12-15 RX ADMIN — DEXMEDETOMIDINE HYDROCHLORIDE 8 MCG: 100 INJECTION, SOLUTION INTRAVENOUS at 10:36

## 2022-12-15 RX ADMIN — FENTANYL CITRATE 50 MCG: 50 INJECTION INTRAMUSCULAR; INTRAVENOUS at 09:43

## 2022-12-15 RX ADMIN — ROCURONIUM BROMIDE 30 MG: 10 INJECTION, SOLUTION INTRAVENOUS at 09:44

## 2022-12-15 RX ADMIN — ROCURONIUM BROMIDE 20 MG: 10 INJECTION, SOLUTION INTRAVENOUS at 09:46

## 2022-12-15 RX ADMIN — FENTANYL CITRATE 50 MCG: 50 INJECTION INTRAMUSCULAR; INTRAVENOUS at 10:47

## 2022-12-15 RX ADMIN — SUGAMMADEX 30 MG: 100 INJECTION, SOLUTION INTRAVENOUS at 10:35

## 2022-12-15 RX ADMIN — FENTANYL CITRATE 50 MCG: 50 INJECTION INTRAMUSCULAR; INTRAVENOUS at 10:52

## 2022-12-15 RX ADMIN — HYDROMORPHONE HYDROCHLORIDE 0.5 MG: 1 INJECTION, SOLUTION INTRAMUSCULAR; INTRAVENOUS; SUBCUTANEOUS at 09:53

## 2022-12-15 RX ADMIN — FENTANYL CITRATE 50 MCG: 50 INJECTION INTRAMUSCULAR; INTRAVENOUS at 09:45

## 2022-12-15 RX ADMIN — DEXAMETHASONE SODIUM PHOSPHATE 10 MG: 10 INJECTION, SOLUTION INTRAMUSCULAR; INTRAVENOUS at 09:47

## 2022-12-15 RX ADMIN — SUGAMMADEX 200 MG: 100 INJECTION, SOLUTION INTRAVENOUS at 10:26

## 2022-12-15 NOTE — OP NOTE
OPERATIVE REPORT  PATIENT NAME: Granville Meckel    :  2000  MRN: [id number]  Pt Location: [facility] OR ROOM 03    SURGERY DATE: 12/15/2022    Surgeon(s) and Role:     * Nasim Goff MD - Primary    Preop Diagnosis:  Enlarged tonsils and adenoids [J35 3]  Nasal turbinate hypertrophy [J34 3]    Post-Op Diagnosis Codes:     * Enlarged tonsils and adenoids [J35 3]     * Nasal turbinate hypertrophy [J34 3]    Procedure(s) (LRB):  TONSILLECTOMY & ADENOIDECTOMY (N/A)  TURBINECTOMY (N/A)    Specimen(s):  * No specimens in log *    Estimated Blood Loss:   Minimal    Drains:  * No LDAs found *    Anesthesia Type:   General    Operative Indications:  Enlarged tonsils and adenoids [J35 3]  Nasal turbinate hypertrophy [J34 3]    Operative Findings:  3+ bilateral tonsils  90 % of the nasopharynx obstructed by the adenoid pad  Good nasal airway after the turbinate reduction  Notably the left inferior pole and right superior pole there were significant bleeding arteries  No significant loss  Complications:   None    Procedure and Technique:  The patient was positively identified and transferred onto the operating table in the supine position  Appropriate monitoring devices were put in place, anesthesia was induced and the patient was intubated without difficulty  The operating room table was then turned 90 degrees, and a shoulder roll was placed  Before proceeding further, the time out procedure was completed  Adenotonsillectomy was performed as follows: A McIvor oral gag was introduced opened and suspended from the edge of the Rios stand  Palpation of the palate revealed no submucosal cleft  Red rubber tubes were passed through bilateral nasal cavities and used to retract the soft palate bilaterally  The right tonsil was grasped, retracted medially and dissected free of the surrounding tissue using the Coblation wand   In a similar fashion, the left tonsil was removed, and hemostasis was accomplished in bilateral tonsillar fossae using the coagulation function of the Coblation wand  Attention was directed to the nasopharynx, where enlarged adenoids were evident  Adenoid tissue was removed, and hemostasis was accomplished using the Coablation wand  Submucous resection of the inferior turbinates was performed bilaterally as follows: Using the coblator the left inferior turbinate head was entered  Submucous tissue was ablated in several passes until an improved nasal airway was seen  Similar procedure was performed on the right  Mucosa was preserved  The turbinate was outfractured using a [place] elevator  The McIvor oral gag was let down for a minute and reopened  Good hemostasis was noted  The red rubber tubes and the McIvor oral gag were then removed  Anesthesia was reversed  The patient was awakened, extubated and taken to the recovery room in stable condition  All counts were correct at the end of the case, and no complications were encountered       I was present for the entire procedure and A qualified resident physician was not available    Patient Disposition:  PACU  and extubated and stable        SIGNATURE: Curtis Martin MD  DATE: December 15, 2022  TIME: 9:47 AM

## 2022-12-15 NOTE — DISCHARGE INSTR - AVS FIRST PAGE
Tonsillectomy and Adenoidectomy  WHAT YOU SHOULD KNOW:   A tonsillectomy is surgery to remove your tonsils  Tonsils are 2 large lumps of tissue in the back of your throat  Adenoids are small lumps of tissue on the top of your throat  Tonsils and adenoids both fight infection  Sometimes only your tonsils are removed  Your adenoids may be taken out at the same time if they are large or infected  AFTER YOU LEAVE:   Medicines:   NSAIDs:  These medicines decrease swelling and pain  You can buy NSAIDs without a doctor's order  Ask your primary healthcare provider which medicine is right for you, and how much to take  Take as directed  NSAIDs can cause stomach bleeding or kidney problems if not taken correctly  Do not take aspirin  This can increase your risk of bleeding  Acetaminophen: This medicine is available without a doctor's order  It may decrease your pain and fever  Ask how much medicine you need and how often to take it  Pain medicines: You may be given a prescription medicine to decrease pain  Do not wait until the pain is severe before you take this medicine  Take your medicine as directed  Call your healthcare provider if you think your medicine is not helping or if you have side effects  Tell him if you are allergic to any medicine  Keep a list of the medicines, vitamins, and herbs you take  Include the amounts, and when and why you take them  Bring the list or the pill bottles to follow-up visits  Carry your medicine list with you in case of an emergency  Follow up with your healthcare provider as directed:  Write down your questions so you remember to ask them during your visits  What to expect after surgery:   Pain and swelling: Your throat may be sore up to 2 weeks after surgery  Your face and neck may be swollen or tender  It may be hard to turn your head  Mild fever: You may have a low fever while your tonsil areas heal  Drink liquids often to help reduce it  Bleeding:  A small amount of bleeding is normal within 24 hours after surgery  Bleeding can also happen 5 to 7 days after surgery when your scabs fall off, or if you have an infection  Ask how much bleeding to expect  Mouth care: It is normal to have mouth pain and bad breath for a few days after surgery  Care for your mouth as follows:  Brush your teeth gently  Avoid harsh gargling or tooth brushing  This can cause bleeding  Gently rinse your mouth as directed to remove blood and mucus  Food and drink:  You will need to follow a liquid diet or soft food diet for several days after surgery  Drink plenty of liquids: This will help prevent fluid loss, keep your temperature down, decrease pain, and speed healing  Liquids and foods that are cool or cold, such as water, apple or grape juice, and popsicles, will help decrease pain and swelling  Do not drink orange juice or grapefruit juice  They may bother your throat  Start with soft foods: Once you can drink liquids and your stomach is not upset, you may then have soft, plain foods  Begin with foods like applesauce, oatmeal, soft-boiled eggs, mashed potatoes, gelatin, and ice cream  Once you can eat soft food easily, you may slowly begin to eat solid foods  Avoid anything hot, spicy, or sharp, such as chips  These foods can hurt your tonsil areas  Avoid hot food and drinks:  Avoid coffee, tea, soup, and other hot or warm foods and drinks  They can increase your risk for bleeding  Avoid milk and dairy foods if you have problems with thick mucus in your throat  This can cause you to cough, which could hurt your surgery areas  Self-care:   Use ice:  Ice helps decrease swelling and pain  Use an ice pack or put crushed ice in a plastic bag  Cover the ice pack with a towel and place it on your throat for 15 to 20 minutes every hour for 2 days  Use a cool humidifier: This will help moisten the air and soothe your throat      Get plenty of rest:  Limit your activity for 7 to 10 days after surgery  It may take 2 weeks for you to recover  Ask when you can drive or return to work  Do not smoke or go to smoky areas:  Until you heal, smoke may cause you to cough or your throat to start bleeding heavily  Stay away from people who have colds, sore throats, or the flu: You may get sick more easily after surgery  Contact your surgeon or primary healthcare provider if:   You have a fever  You have throat pain or an earache that is worse than expected  You have pus or blood draining down your throat  You have itchy skin or a rash  You have any questions or concerns about your condition or care  Seek care immediately or call 911 if:   You have bright red bleeding from your nose or mouth, or bleeding that is worse than what you were told to expect  You feel weak, dizzy, or like you might faint when you sit up or stand  You have severe throat pain with drooling or voice changes  Your neck is stiff and painful  You have swelling or pain in your face or neck  You have back or chest pain  You have trouble breathing or swallowing  Call Dr Vinh Alcantara with any questions or concerns: office ; mobile  (urgent issues)  Tonsillectomy and Adenoidectomy Postoperative Instructions    What to expect:  -Pink or blood streaked saliva during the first 24 hours  -Patient may refuse to eat or drink anything by mouth  Limited food intake is acceptable in the first 2-3 days as long as he or she is drinking plenty of fluids (urine remains light yellow or clear)    Offer sips of liquid (water, juice, Gatorade, Pedialyte) every hour   -Bad breath  -White/Yellow/Gray coating in the back of the throat  -Pain with swallowing/talking  -Ear pain    What to Avoid x 2 weeks:  -Do Not eat foods with sharp edges or crunchy coatings for 2 weeks following surgery; Stick with soft/mushy foods (pasta, mashed potatoes, baked chicken, cooked vegetables, pudding, etc )  -Do Not drink fluids with red dye since it can look like blood  -Do Not eat or drink anything that is hot or acidic (orange juice, soda, etc )  -Do Not gargle  -Do Not strain or lift anything heavy  -Do Not take aspirin or blood thinners until instructed to do so by your doctor    When to call the doctor or go to Emergency Room:  -Bright red blood coming from the mouth or nose  -Coughing up dark blood or blood clots  -Shortness of breath  -Persistent nausea/vomiting  -Temperature above 101 F  -Feeling faint or dizzy  -Decreased urine output compared to before surgery     Follow up with your doctor in 2-3 weeks, or as instructed  [facility]:  [facility]:  [facility]:  [address]:  [phone]     Medications    Use alternating doses of Acetaminophen (Tylenol) and Ibuprofen (Motrin) every 3 hours  Example:  9:00 am 12:00 pm 3:00 pm 6:00 pm 9:00 pm 12:00 am 3:00 am 6:00 am 9:00 am   Tylenol Motrin Tylenol Motrin Tylenol Motrin Tylenol Motrin Tylenol       Acetaminophen (Tylenol)  15 mL (of 160mg/5mL) by mouth every 6 hours  (10mg/kg/dose)    Alternating with:    Ibuprofen (Motrin)  20 mL (of 100mg/5mL) by mouth every 6 hours  (5-10mg/kg/dose, not to exceed 40 mg/kg/day)      Use ONLY as needed for breakthrough pain (patients >10years old):    Oxycodone liquid  5 mg by mouth every *** hours as needed        NOTE: Do not exceed more than 2 grams of Acetaminophen in 24 hours if under 15years old, or 3-4 grams of Acetaminophen in 24 hours if over 15years old  Do not take more than 1 medication containing acetaminophen (Tylenol) at the same time

## 2022-12-15 NOTE — ANESTHESIA POSTPROCEDURE EVALUATION
Post-Op Assessment Note    CV Status:  Stable  Pain Score: 0    Pain management: adequate     Mental Status:  Alert and awake   Hydration Status:  Euvolemic   PONV Controlled:  Controlled   Airway Patency:  Patent      Post Op Vitals Reviewed: Yes      Staff: CRNA, Anesthesiologist         No notable events documented      BP   139/81   Temp     Pulse  80   Resp   12   SpO2   100

## 2022-12-15 NOTE — H&P
Isabel Hodgson is a 25 y o male who presents for re-evaluation of nasal obstruction  He has a history likely some obstructive sleep apnea and adenotonsillar hypertrophy  He saw my colleague Dr Amilcar Patterson as well as [facility] team for sleep and was thought to treatable symptoms  He has a planned sleep study in October  History reviewed  No pertinent past medical history  /82   Pulse 73   Temp (!) 97 °F (36 1 °C) (Temporal)   Resp 20   Ht 6' 2" (1 88 m)   Wt 85 3 kg (188 lb)   SpO2 95%   BMI 24 14 kg/m²       Physical Exam   Constitutional: Oriented to person, place, and time  Well-developed and well-nourished, no apparent distress, non-toxic appearance  Cooperative, able to hear and answer questions without difficulty  Voice: Normal voice quality  Head: Normocephalic, atraumatic  No scars, masses or lesions  Face: Symmetric, no edema, no sinus tenderness  Eyes: Vision grossly intact, extra-ocular movement intact  Ears: External ear normal   Bilateral tympanic membranes are intact with intact normal landmarks  No post-auricular erythema or tenderness  Nose:    Mucosa: Edematous   Turbinates: Hypertrophic   Septum:  Anterior right and posterior left septal deviation   Internal valves:  Bilateral internal valve stenosis   External valves:   Right  Left      Zone 1: 0  0      Zone 2: 0  0   Tip support:  Poor tip support with tip ptosis and poly beak deformity   External contour:  Leftward deviation of the entire nasal pyramid and as above poly beak deformity   Nasal bones:  No palpable fractures  Oral cavity: Dentition intact  Mucosa moist, lips normal   Tongue mobile, floor of mouth normal   Hard palate unremarkable  No masses or lesions  Oropharynx: Uvula is midline, soft palate normal   Unremarkable oropharyngeal inlet  Tonsils unremarkable  Posterior pharyngeal wall clear  No masses or lesions    Salivary glands:  Parotid glands and submandibular glands symmetric, no enlargement or tenderness  Neck: Normal laryngeal elevation with swallow  Trachea midline  No masses or lesions  No palpable adenopathy  Thyroid: normal in size, unremarkable without tenderness or palpable nodules  Pulmonary/Chest: Normal effort and rate  No respiratory distress  Clear to ausculation bilaterally  Musculoskeletal: Normal range of motion  Neurological: Cranial nerves 2-12 intact  Skin: Skin is warm and dry  Psychiatric: Normal mood and affect  CV: RRR    A/P: Nasal obstruction: Risks and benefits were again reviewed with the patient, including but not limited to bleeding, infection, external scars, internal scars, breathing obstruction, septal perforation, external deformity, numbness or stiffness of the nasal tip, asymmetry, need for revision, numbness of the upper teeth, among others  Questions were answered  History and physical and consent were obtained today in clinic  Plan is for:      Risks, benefits, and alternatives for tonsillectomy and possible adenoidectomy were discussed  Informed consent was obtained  Risks discussed were included, but not limited to, 4% risk of postoperative bleeding requiring operative intervention, the velopharyngeal insufficiency, tooth tongue or gum injury, and postoperative dehydration  We discussed the need for appropriate pain control to prevent dehydration  Follow-up 3 weeks after surgery      Will plan for turbinate reduction during todays surgery

## 2022-12-16 NOTE — ANESTHESIA POSTPROCEDURE EVALUATION
Post-Op Assessment Note             Reason for prolonged intubation > 24 hours:  Error  pt was extubated post op       No notable events documented      BP      Temp      Pulse     Resp      SpO2

## 2023-06-29 ENCOUNTER — OFFICE VISIT (OUTPATIENT)
Dept: INTERNAL MEDICINE CLINIC | Facility: CLINIC | Age: 23
End: 2023-06-29
Payer: COMMERCIAL

## 2023-06-29 VITALS
WEIGHT: 208.4 LBS | HEIGHT: 74 IN | BODY MASS INDEX: 26.75 KG/M2 | DIASTOLIC BLOOD PRESSURE: 80 MMHG | TEMPERATURE: 97 F | SYSTOLIC BLOOD PRESSURE: 126 MMHG | HEART RATE: 66 BPM | OXYGEN SATURATION: 98 %

## 2023-06-29 DIAGNOSIS — D17.9 MULTIPLE LIPOMAS: Primary | ICD-10-CM

## 2023-06-29 PROBLEM — G47.19 EXCESSIVE DAYTIME SLEEPINESS: Status: RESOLVED | Noted: 2022-08-17 | Resolved: 2023-06-29

## 2023-06-29 PROBLEM — R35.1 NOCTURIA: Status: RESOLVED | Noted: 2022-08-17 | Resolved: 2023-06-29

## 2023-06-29 PROBLEM — J34.3 NASAL TURBINATE HYPERTROPHY: Status: RESOLVED | Noted: 2022-12-15 | Resolved: 2023-06-29

## 2023-06-29 PROBLEM — J34.2 ACQUIRED DEVIATED NASAL SEPTUM: Status: RESOLVED | Noted: 2022-06-08 | Resolved: 2023-06-29

## 2023-06-29 PROBLEM — G47.33 OBSTRUCTIVE SLEEP APNEA: Status: RESOLVED | Noted: 2022-08-17 | Resolved: 2023-06-29

## 2023-06-29 PROBLEM — G47.00 INSOMNIA: Status: RESOLVED | Noted: 2022-08-17 | Resolved: 2023-06-29

## 2023-06-29 PROBLEM — R53.83 FATIGUE: Status: RESOLVED | Noted: 2022-08-17 | Resolved: 2023-06-29

## 2023-06-29 PROBLEM — J35.1 CHRONIC TONSILLAR HYPERTROPHY: Status: RESOLVED | Noted: 2022-08-17 | Resolved: 2023-06-29

## 2023-06-29 PROCEDURE — 99213 OFFICE O/P EST LOW 20 MIN: CPT | Performed by: INTERNAL MEDICINE

## 2023-06-29 NOTE — PROGRESS NOTES
INTERNAL MEDICINE OFFICE VISIT       NAME: Angelica Stringer  AGE: 25 y o  SEX: male       : 2000        MRN: [id number]    DATE: 2023  TIME: 10:22 AM    Assessment and Plan   1  Multiple lipomas         There are no Patient Instructions on file for this visit  Chief Complaint     Chief Complaint   Patient presents with   • Mass     Noticed couple months ago, stayed same, little pain  History of Present Illness   Angelica Stringer is a 25y o -year-old male who history of lipomas with removal of a lipoma of the torso in the past in the lower abdominal region  Exam today was consistent with a small lipoma of the left forearm and a larger lipoma of the extreme lower left back region  We discussed benign nature of these  His mother and his maternal grandfather have lipomas  We discussed that he may have more lipomas in the future  The option of removal was discussed versus observation and Lala Clinton opted for observation  We discussed that if there is a sudden enlargement or pain from these to gross, that would warrant further investigation such as ultrasound  Following successful tonsillectomy and adenoidectomy, Lala Clinton had recent successful repair of deviated nasal septum and turbinate surgery  He is wearing a nasal splint and has sutures in place with nasal stents  His follow-up with surgery is tomorrow  He is doing well  He graduated from college and has a  related job that he will start remotely in August and then will move to [place] next year  He is looking forward to this          Review of Systems   Review of Systems    Active Problem List     Patient Active Problem List   Diagnosis   • Mild exercise-induced asthma   • Environmental allergies   • Multiple lipomas       The following portions of the patient's history were reviewed and updated as appropriate: allergies, current medications, past family history, past medical history, past social history, past surgical history, and problem list     Objective     Vitals:    06/29/23 1008   BP: 126/80   Pulse: 66   Temp: (!) 97 °F (36 1 °C)   SpO2: 98%     Wt Readings from Last 3 Encounters:   06/29/23 94 5 kg (208 lb 6 4 oz)   05/30/23 90 7 kg (200 lb)   01/04/23 85 3 kg (188 lb)       Physical Exam     Vital signs stable, alert and oriented in no distress  He has a splint over the bridge of his nose and is moving air well  He appears in no distress  He has a small subcutaneous freely movable nodular growth consistent with lipoma of the left forearm and wart gelatinous, larger freely movable subcutaneous nontender growth of the left low back region  ALLERGIES:  No Known Allergies    Current Medications     Current Outpatient Medications   Medication Sig Dispense Refill   • albuterol (PROVENTIL HFA,VENTOLIN HFA) 90 mcg/act inhaler Inhale 2 puffs every 6 (six) hours as needed       No current facility-administered medications for this visit           Llewellyn Kanner, MD

## 2023-10-20 ENCOUNTER — OFFICE VISIT (OUTPATIENT)
Dept: INTERNAL MEDICINE CLINIC | Facility: CLINIC | Age: 23
End: 2023-10-20
Payer: COMMERCIAL

## 2023-10-20 VITALS
TEMPERATURE: 98.1 F | SYSTOLIC BLOOD PRESSURE: 122 MMHG | DIASTOLIC BLOOD PRESSURE: 82 MMHG | HEART RATE: 74 BPM | RESPIRATION RATE: 16 BRPM | HEIGHT: 74 IN | WEIGHT: 217 LBS | OXYGEN SATURATION: 97 % | BODY MASS INDEX: 27.85 KG/M2

## 2023-10-20 DIAGNOSIS — B96.89 ACUTE BACTERIAL SINUSITIS: Primary | ICD-10-CM

## 2023-10-20 DIAGNOSIS — J01.90 ACUTE BACTERIAL SINUSITIS: Primary | ICD-10-CM

## 2023-10-20 PROCEDURE — 99213 OFFICE O/P EST LOW 20 MIN: CPT | Performed by: INTERNAL MEDICINE

## 2023-10-20 RX ORDER — AMOXICILLIN AND CLAVULANATE POTASSIUM 875; 125 MG/1; MG/1
1 TABLET, FILM COATED ORAL EVERY 12 HOURS SCHEDULED
Qty: 20 TABLET | Refills: 0 | Status: SHIPPED | OUTPATIENT
Start: 2023-10-20 | End: 2023-10-30

## 2023-10-20 NOTE — PROGRESS NOTES
INTERNAL MEDICINE OFFICE VISIT       NAME: Rainer Schafer  AGE: 21 y.o. SEX: male       : 2000        MRN: [id number]    DATE: 10/20/2023  TIME: 1:55 PM    Assessment and Plan   1. Acute bacterial sinusitis  -     amoxicillin-clavulanate (AUGMENTIN) 875-125 mg per tablet; Take 1 tablet by mouth every 12 (twelve) hours for 10 days         Start Mucinex as directed, plenty of fluids and start amoxicillin. We discussed expecting improvement in next 2 to 3 days and follow-up can be as needed. Chief Complaint   Upper respiratory congestion    History of Present Illness   Rainer Schafer is a 21y.o.-year-old male who was feeling well until earlier this week when after coming back from visiting his girlfriend in college, he began noticing sinus pressure, some yellow goop in the eyes, some yellow postnasal drainage and secondary sore throat. There is occasional clearing of his throat but no true cough. There is no wheezing or dyspnea. There is no fever chills or sweats. Perry Mcfadden has been taking ibuprofen with some relief. Review of Systems   Review of Systems as above    Active Problem List     Patient Active Problem List   Diagnosis    Mild exercise-induced asthma    Environmental allergies    Multiple lipomas       The following portions of the patient's history were reviewed and updated as appropriate: allergies, current medications, past family history, past medical history, past social history, past surgical history, and problem list.    Objective     Vitals:    10/20/23 1328   BP: 122/82   Pulse: 74   Resp: 16   Temp: 98.1 °F (36.7 °C)   SpO2: 97%     Wt Readings from Last 3 Encounters:   10/20/23 98.4 kg (217 lb)   23 97.5 kg (215 lb)   23 90.7 kg (200 lb)       Physical Exam    Vital signs stable, alert and oriented in no acute distress, afebrile. Voice sounds slightly hoarse, and nasal.  There is some fullness over the facial sinuses and mild nonpurulent conjunctivitis.   Exam of nose and throat notable for some yellow postnasal drainage, nonspecific redness of the throat, no airway compromise, no hemorrhages or exudates, tonsils surgically absent. There is mild submandibular adenopathy. The neck is supple. The lungs are clear and the cardiac exam is normal and auscultation. Skin well-hydrated without rash. Circulation intact. ALLERGIES:  No Known Allergies    Current Medications     Current Outpatient Medications   Medication Sig Dispense Refill    albuterol (PROVENTIL HFA,VENTOLIN HFA) 90 mcg/act inhaler Inhale 2 puffs every 6 (six) hours as needed      amoxicillin-clavulanate (AUGMENTIN) 875-125 mg per tablet Take 1 tablet by mouth every 12 (twelve) hours for 10 days 20 tablet 0     No current facility-administered medications for this visit.          Xavier Barber MD

## 2024-07-01 ENCOUNTER — TELEPHONE (OUTPATIENT)
Dept: INTERNAL MEDICINE CLINIC | Facility: CLINIC | Age: 24
End: 2024-07-01

## 2024-07-01 DIAGNOSIS — Z13.1 SCREENING FOR DIABETES MELLITUS: ICD-10-CM

## 2024-07-01 DIAGNOSIS — Z13.0 SCREENING FOR DEFICIENCY ANEMIA: Primary | ICD-10-CM

## 2024-07-01 DIAGNOSIS — Z13.220 SCREENING FOR HYPERLIPIDEMIA: ICD-10-CM

## 2024-07-01 NOTE — TELEPHONE ENCOUNTER
ppointment for: Jitendra Ibarra ([id number])  Visit type: [facility] ([id number])  7/19/2024 2:20 PM (40 minutes) with Brandon Macdonald MD in [facility] INTERNAL MED     Patient comments:  Yearly visit- could you provide paper prescription if bloodwork is required for this visit?     Dr macdonald please advise if you want to order labs.  Thank you.

## 2024-07-03 ENCOUNTER — APPOINTMENT (OUTPATIENT)
Dept: LAB | Facility: MEDICAL CENTER | Age: 24
End: 2024-07-03
Payer: COMMERCIAL

## 2024-07-03 DIAGNOSIS — Z13.1 SCREENING FOR DIABETES MELLITUS: ICD-10-CM

## 2024-07-03 DIAGNOSIS — Z13.220 SCREENING FOR HYPERLIPIDEMIA: ICD-10-CM

## 2024-07-03 LAB
ALBUMIN SERPL BCG-MCNC: 4.5 G/DL (ref 3.5–5)
ALP SERPL-CCNC: 30 U/L (ref 34–104)
ALT SERPL W P-5'-P-CCNC: 18 U/L (ref 7–52)
AMORPH URATE CRY URNS QL MICRO: ABNORMAL
ANION GAP SERPL CALCULATED.3IONS-SCNC: 9 MMOL/L (ref 4–13)
AST SERPL W P-5'-P-CCNC: 16 U/L (ref 13–39)
BACTERIA UR QL AUTO: ABNORMAL /HPF
BASOPHILS # BLD AUTO: 0.06 THOUSANDS/ÂΜL (ref 0–0.1)
BASOPHILS NFR BLD AUTO: 1 % (ref 0–1)
BILIRUB SERPL-MCNC: 0.74 MG/DL (ref 0.2–1)
BILIRUB UR QL STRIP: NEGATIVE
BUN SERPL-MCNC: 11 MG/DL (ref 5–25)
CALCIUM SERPL-MCNC: 9.5 MG/DL (ref 8.4–10.2)
CHLORIDE SERPL-SCNC: 103 MMOL/L (ref 96–108)
CHOLEST SERPL-MCNC: 174 MG/DL
CLARITY UR: CLEAR
CO2 SERPL-SCNC: 26 MMOL/L (ref 21–32)
COLOR UR: YELLOW
CREAT SERPL-MCNC: 0.95 MG/DL (ref 0.6–1.3)
EOSINOPHIL # BLD AUTO: 0.28 THOUSAND/ÂΜL (ref 0–0.61)
EOSINOPHIL NFR BLD AUTO: 4 % (ref 0–6)
ERYTHROCYTE [DISTWIDTH] IN BLOOD BY AUTOMATED COUNT: 12.2 % (ref 11.6–15.1)
EST. AVERAGE GLUCOSE BLD GHB EST-MCNC: 85 MG/DL
GFR SERPL CREATININE-BSD FRML MDRD: 112 ML/MIN/1.73SQ M
GLUCOSE P FAST SERPL-MCNC: 99 MG/DL (ref 65–99)
GLUCOSE UR STRIP-MCNC: NEGATIVE MG/DL
HBA1C MFR BLD: 4.6 %
HCT VFR BLD AUTO: 46.4 % (ref 36.5–49.3)
HDLC SERPL-MCNC: 34 MG/DL
HGB BLD-MCNC: 16.7 G/DL (ref 12–17)
HGB UR QL STRIP.AUTO: NEGATIVE
IMM GRANULOCYTES # BLD AUTO: 0.01 THOUSAND/UL (ref 0–0.2)
IMM GRANULOCYTES NFR BLD AUTO: 0 % (ref 0–2)
KETONES UR STRIP-MCNC: NEGATIVE MG/DL
LDLC SERPL CALC-MCNC: 114 MG/DL (ref 0–100)
LEUKOCYTE ESTERASE UR QL STRIP: NEGATIVE
LYMPHOCYTES # BLD AUTO: 2.22 THOUSANDS/ÂΜL (ref 0.6–4.47)
LYMPHOCYTES NFR BLD AUTO: 32 % (ref 14–44)
MCH RBC QN AUTO: 30.4 PG (ref 26.8–34.3)
MCHC RBC AUTO-ENTMCNC: 36 G/DL (ref 31.4–37.4)
MCV RBC AUTO: 84 FL (ref 82–98)
MONOCYTES # BLD AUTO: 0.62 THOUSAND/ÂΜL (ref 0.17–1.22)
MONOCYTES NFR BLD AUTO: 9 % (ref 4–12)
NEUTROPHILS # BLD AUTO: 3.86 THOUSANDS/ÂΜL (ref 1.85–7.62)
NEUTS SEG NFR BLD AUTO: 54 % (ref 43–75)
NITRITE UR QL STRIP: NEGATIVE
NON-SQ EPI CELLS URNS QL MICRO: ABNORMAL /HPF
NONHDLC SERPL-MCNC: 140 MG/DL
NRBC BLD AUTO-RTO: 0 /100 WBCS
PH UR STRIP.AUTO: 7 [PH]
PLATELET # BLD AUTO: 251 THOUSANDS/UL (ref 149–390)
PMV BLD AUTO: 9.3 FL (ref 8.9–12.7)
POTASSIUM SERPL-SCNC: 4.2 MMOL/L (ref 3.5–5.3)
PROT SERPL-MCNC: 6.8 G/DL (ref 6.4–8.4)
PROT UR STRIP-MCNC: ABNORMAL MG/DL
RBC # BLD AUTO: 5.5 MILLION/UL (ref 3.88–5.62)
RBC #/AREA URNS AUTO: ABNORMAL /HPF
SODIUM SERPL-SCNC: 138 MMOL/L (ref 135–147)
SP GR UR STRIP.AUTO: 1.02 (ref 1–1.03)
TRIGL SERPL-MCNC: 131 MG/DL
UROBILINOGEN UR STRIP-ACNC: <2 MG/DL
WBC # BLD AUTO: 7.05 THOUSAND/UL (ref 4.31–10.16)
WBC #/AREA URNS AUTO: ABNORMAL /HPF

## 2024-07-03 PROCEDURE — 81001 URINALYSIS AUTO W/SCOPE: CPT

## 2024-07-03 PROCEDURE — 85025 COMPLETE CBC W/AUTO DIFF WBC: CPT | Performed by: INTERNAL MEDICINE

## 2024-07-03 PROCEDURE — 83036 HEMOGLOBIN GLYCOSYLATED A1C: CPT | Performed by: INTERNAL MEDICINE

## 2024-07-03 PROCEDURE — 80053 COMPREHEN METABOLIC PANEL: CPT | Performed by: INTERNAL MEDICINE

## 2024-07-03 PROCEDURE — 36415 COLL VENOUS BLD VENIPUNCTURE: CPT | Performed by: INTERNAL MEDICINE

## 2024-07-03 PROCEDURE — 80061 LIPID PANEL: CPT

## 2024-07-19 ENCOUNTER — OFFICE VISIT (OUTPATIENT)
Dept: INTERNAL MEDICINE CLINIC | Facility: CLINIC | Age: 24
End: 2024-07-19

## 2024-07-19 VITALS
OXYGEN SATURATION: 99 % | HEART RATE: 75 BPM | TEMPERATURE: 98.5 F | SYSTOLIC BLOOD PRESSURE: 118 MMHG | BODY MASS INDEX: 26.44 KG/M2 | HEIGHT: 74 IN | DIASTOLIC BLOOD PRESSURE: 86 MMHG | WEIGHT: 206 LBS

## 2024-07-19 DIAGNOSIS — Z00.00 ANNUAL PHYSICAL EXAM: Primary | ICD-10-CM

## 2024-07-19 PROBLEM — Z91.09 ENVIRONMENTAL ALLERGIES: Status: RESOLVED | Noted: 2019-01-17 | Resolved: 2024-07-19

## 2024-07-19 NOTE — PROGRESS NOTES
INTERNAL MEDICINE OFFICE VISIT       NAME: Jitendra Ibarra  AGE: 23 y.o. SEX: male       : 2000        MRN: [id number]    DATE: 2024  TIME: 2:58 PM    Assessment and Plan   1. Annual physical exam       Follow up in 1 year        Chief Complaint   Annual physical    History of Present Illness   Jitendra Ibarra is a 23 y.o.-year-old male who returns for an annual physical and is feeling well. Jitendra enjoys his work as a  for a [place] based company that allows him to work from home.     Jitendra goes for a walk each evening with his mom and lives at home.     He no longer has asthma or allergies.     There are no new problems.     We reviewed his annual labs: benign with discussion of low HDL as genetic and no intervention. Other minor lab abnormalities not clinically significant.     Review of Systems   Review of Systems    Active Problem List     Patient Active Problem List   Diagnosis    Multiple lipomas       The following portions of the patient's history were reviewed and updated as appropriate: allergies, current medications, past family history, past medical history, past social history, past surgical history, and problem list.    Objective     Vitals:    24 1414   BP: 118/86   Pulse: 75   Temp: 98.5 °F (36.9 °C)   SpO2: 99%     Wt Readings from Last 3 Encounters:   24 93.4 kg (206 lb)   24 97.5 kg (215 lb)   10/20/23 98.4 kg (217 lb)       Physical Exam    VSS, afebrile, pulse regular    Alert and Oriented in NAD    HEENT: NCAT, Pupils equal, 3 mm, EOEMI, no icterus or pallor, no iritis or conjunctivitis. No head or neck mass or adenopathy.     Ears:  normal-appearing external auditory canals and tympanic membranes,     Nose: patent nasal passages without polyps or masses, no septal deviation, no nasal deformity,     Oral cavity and throat: normal dentition, no gum disease, normal mucosa, patent airway, no hemorrhages or exudates in the throat.  Exam of salivary  glands and ducts are normal. No submandibular or submental adenopathy    Neck: supple, no trauma or tenderness.  No JVD.  Normal carotid upstroke and descent without bruits.  Trachea midline without stridor.  Thyroid exam normal on inspection and palpation.  No spinal tenderness, full range of motion.    Lymphatics: no adenopathy throughout    Chest:  No trauma or deformity, no supraclavicular adenopathy or bruit.  Chest wall expansion is symmetric and normal, expiratory phase is not prolonged.    Heart:  Regular rate and rhythm, normal S1-S2, no S4-S3, no clicks murmurs or rubs.    Lungs:  Clear to auscultation and normal to percussion.    Back:  No trauma or deformity, no CVA mass or CVA tenderness.    Skin:  No rash or skin malignancy.    Abdomen:  Nondistended, normal bowel sounds, soft nontender without masses bruits organomegaly.  There is no hernia.  There are no surgical scars.    Extremities:  Arterial circulation is symmetrically normal with no clubbing cyanosis or edema.  There are no varicosities, there is no calf tenderness or phlebitic findings.    Joints:  No deformity, swelling, redness, tenderness or increased temperature, no instability.  There are no tophi.    Affect:  Normal    Neurologic:  Normal and stable gait, and otherwise no focal findings as well.    Cognitive:  Intact.       Pertinent Laboratory/Diagnostic Studies:            ALLERGIES:  No Known Allergies    Current Medications     No current outpatient medications on file.     No current facility-administered medications for this visit.         Health Maintenance        Brandon Macdonald MD

## 2024-11-19 ENCOUNTER — OFFICE VISIT (OUTPATIENT)
Age: 24
End: 2024-11-19
Payer: COMMERCIAL

## 2024-11-19 VITALS
HEART RATE: 89 BPM | HEIGHT: 74 IN | WEIGHT: 208 LBS | DIASTOLIC BLOOD PRESSURE: 68 MMHG | SYSTOLIC BLOOD PRESSURE: 124 MMHG | BODY MASS INDEX: 26.69 KG/M2 | OXYGEN SATURATION: 97 %

## 2024-11-19 DIAGNOSIS — J02.9 PHARYNGITIS, UNSPECIFIED ETIOLOGY: Primary | ICD-10-CM

## 2024-11-19 LAB — S PYO AG THROAT QL: NEGATIVE

## 2024-11-19 PROCEDURE — 99213 OFFICE O/P EST LOW 20 MIN: CPT | Performed by: INTERNAL MEDICINE

## 2024-11-19 PROCEDURE — 87880 STREP A ASSAY W/OPTIC: CPT | Performed by: INTERNAL MEDICINE

## 2024-11-19 NOTE — PROGRESS NOTES
"Name: Jitendra Ibarra      : 2000      MRN: [id number]  Encounter Provider: Brandon Macdonald MD  Encounter Date: 2024   Encounter department: [facility] INTERNAL MEDICINE  :  Assessment & Plan  Pharyngitis, unspecified etiology  Pain, and dryness noted by patient.   Physical exam shows erythema, swelling of pharynx as well as surrounding lymph   No involvement of the submandibular lymph nodes, swallow is midline   Orders:    POCT rapid ANTIGEN strepA    amoxicillin-clavulanate (AUGMENTIN) 875-125 mg per tablet; Take 1 tablet by mouth every 12 (twelve) hours for 10 days           History of Present Illness     Jitendra is a 23 yo male with a PMHx of frequent strep infections s/p tonsillectomy. Since last Wednesday he has been experiencing a sore throat accompanied by dryness and generalized fatigue. He denies any visual symptoms, fevers, chills, and N/V,D. He continues to have a strong appetite and good exercise tolerance. He notes since the surgery he has been sick much less frequently. Given the duration and lack of improvement in symptomology we will treat with antibiotics and counseled to call the office by Friday if no improvement noted with Augmentin treatment.       Review of Systems   Constitutional:  Positive for fatigue. Negative for chills and fever.   HENT:  Negative for congestion.    Eyes:  Positive for redness. Negative for visual disturbance.   Gastrointestinal:  Negative for constipation, diarrhea and vomiting.   Genitourinary: Negative.    Skin: Negative.    Psychiatric/Behavioral: Negative.            Objective   /68   Pulse 89   Ht 6' 2\" (1.88 m)   Wt 94.3 kg (208 lb)   SpO2 97%   BMI 26.71 kg/m²      Physical Exam  Vitals reviewed.   Constitutional:       General: He is not in acute distress.     Appearance: He is normal weight. He is not ill-appearing, toxic-appearing or diaphoretic.   HENT:      Head: Normocephalic and atraumatic.      Nose: Congestion present. No " rhinorrhea.      Mouth/Throat:      Mouth: Mucous membranes are dry.      Pharynx: Posterior oropharyngeal erythema present. No oropharyngeal exudate.   Eyes:      General: No scleral icterus.        Right eye: No discharge.         Left eye: No discharge.      Extraocular Movements: Extraocular movements intact.      Comments: Erythematous conjunctivae    Cardiovascular:      Rate and Rhythm: Normal rate and regular rhythm.      Pulses: Normal pulses.      Heart sounds: Normal heart sounds. No murmur heard.     No friction rub. No gallop.   Pulmonary:      Effort: Pulmonary effort is normal. No respiratory distress.      Breath sounds: No stridor. No wheezing, rhonchi or rales.   Chest:      Chest wall: No tenderness.   Abdominal:      General: Abdomen is flat. Bowel sounds are normal. There is no distension.      Palpations: Abdomen is soft. There is no mass.      Tenderness: There is no abdominal tenderness. There is no right CVA tenderness, left CVA tenderness, guarding or rebound.      Hernia: No hernia is present.   Musculoskeletal:         General: No swelling, tenderness, deformity or signs of injury. Normal range of motion.      Cervical back: Normal range of motion and neck supple. No rigidity or tenderness.      Right lower leg: No edema.      Left lower leg: No edema.   Skin:     General: Skin is warm.      Coloration: Skin is not jaundiced or pale.      Findings: No bruising, erythema, lesion or rash.   Neurological:      General: No focal deficit present.      Mental Status: He is alert and oriented to person, place, and time. Mental status is at baseline.      Cranial Nerves: No cranial nerve deficit.      Sensory: No sensory deficit.      Motor: No weakness.      Coordination: Coordination normal.      Gait: Gait normal.      Deep Tendon Reflexes: Reflexes normal.   Psychiatric:         Mood and Affect: Mood normal.         Behavior: Behavior normal.         Thought Content: Thought content normal.          Judgment: Judgment normal.

## 2025-01-10 ENCOUNTER — TELEPHONE (OUTPATIENT)
Age: 25
End: 2025-01-10

## 2025-01-31 ENCOUNTER — OFFICE VISIT (OUTPATIENT)
Age: 25
End: 2025-01-31
Payer: COMMERCIAL

## 2025-01-31 VITALS
HEART RATE: 67 BPM | DIASTOLIC BLOOD PRESSURE: 88 MMHG | SYSTOLIC BLOOD PRESSURE: 136 MMHG | BODY MASS INDEX: 26.15 KG/M2 | HEIGHT: 74 IN | TEMPERATURE: 97.3 F | OXYGEN SATURATION: 98 % | WEIGHT: 203.8 LBS

## 2025-01-31 DIAGNOSIS — R07.89 LEFT-SIDED CHEST WALL PAIN: Primary | ICD-10-CM

## 2025-01-31 PROCEDURE — 99213 OFFICE O/P EST LOW 20 MIN: CPT | Performed by: INTERNAL MEDICINE

## 2025-01-31 NOTE — PROGRESS NOTES
INTERNAL MEDICINE OFFICE VISIT       NAME: Jitendra Ibarra  AGE: 24 y.o. SEX: male       : 2000        MRN: [id number]    DATE: 2025  TIME: 1:57 PM    Assessment and Plan   1. Left-sided chest wall pain (Primary)  Symptoms are consistent with minor trauma while lifting weights, causing sensory neuropathy in the 6 rib area, midclavicular line.  Plan is to get this time, including continue to avoid upper body weight lifting, expecting sudden improvement and resolution.      Chief Complaint     Chief Complaint   Patient presents with    Chest Pain     On and off pain for last month pain level 6 when it comes     Dryness on toes        History of Present Illness   Jitendra Ibarra is a 24 y.o.-year-old male who was feeling well until about a month ago.  He noticed that after lifting weights 1 day, which involved using a canister with water in it, lifting over his head, the water and the cancer shifted and he had to bear more weight with his left side.  Since that time, he has had bleeding burning followed by dissipation and expansion of mild discomfort emanating from the area of burning, for a few seconds associated with certain movements.  This is not exertional, but rather mechanical.  There is no associated shortness of breath, no GI symptoms.  His symptoms were last month are not better nor worse.  He has not taking anything for the symptoms.    Other factors are working long hours at home as he works remotely.  Work is been fairly intense lately.  His workstation is set up fairly ergonomically.    Review of Systems   Review of Systems   Constitutional:  Negative for chills and fever.   HENT:  Negative for congestion.    Respiratory:  Negative for chest tightness and shortness of breath.    Cardiovascular:  Positive for chest pain.   Gastrointestinal: Negative.        Active Problem List     Patient Active Problem List   Diagnosis    Multiple lipomas       The following portions of the patient's history were  reviewed and updated as appropriate: allergies, current medications, past family history, past medical history, past social history, past surgical history, and problem list.    Objective     Vitals:    01/31/25 1257   BP: 136/88   Pulse: 67   Temp: (!) 97.3 °F (36.3 °C)   SpO2: 98%     Wt Readings from Last 3 Encounters:   01/31/25 92.4 kg (203 lb 12.8 oz)   11/19/24 94.3 kg (208 lb)   07/19/24 93.4 kg (206 lb)       Physical Exam    Vital signs stable, alert and oriented no distress.  Pulse regular.  Skin warm and dry without pallor or icterus.  Chest: No trauma or deformity.  Jitendra points to the lower left rib cage, around the midclavicular line, sixth rib area.  He cannot reproduce pain by pressing on this area nor could I.  There is no rash.  Lungs are clear throughout.  Cardiac: Regular rate and rhythm, normal S1 and S2, no murmur.  Abdomen: Nondistended with normal bowel sounds, nontender and soft without masses bruits or organomegaly.  Peripheral circulation intact.              ALLERGIES:  No Known Allergies    Current Medications     No current outpatient medications on file.     No current facility-administered medications for this visit.         Health Maintenance        Brandon Macdonald MD

## 2025-03-25 ENCOUNTER — OFFICE VISIT (OUTPATIENT)
Age: 25
End: 2025-03-25
Payer: COMMERCIAL

## 2025-03-25 VITALS
HEART RATE: 82 BPM | TEMPERATURE: 97.2 F | DIASTOLIC BLOOD PRESSURE: 76 MMHG | SYSTOLIC BLOOD PRESSURE: 124 MMHG | BODY MASS INDEX: 27.35 KG/M2 | OXYGEN SATURATION: 86 % | WEIGHT: 213 LBS

## 2025-03-25 DIAGNOSIS — R06.02 SHORTNESS OF BREATH: Primary | ICD-10-CM

## 2025-03-25 PROCEDURE — 99213 OFFICE O/P EST LOW 20 MIN: CPT | Performed by: INTERNAL MEDICINE

## 2025-03-25 RX ORDER — HYDROCORTISONE 25 MG/G
1 CREAM TOPICAL 4 TIMES DAILY PRN
COMMUNITY
Start: 2025-01-31

## 2025-03-25 NOTE — ASSESSMENT & PLAN NOTE
Presenting with SOB for the past 4 days  With associated sore throat and nasal congestion  Denies fevers, chills, cough, sinus pain, headache, ear pain, recent sick contact, GI symptoms, lack of appetite  Denies any chest pain, palpitations, lightheadedness, orthopnea, long-distance travel, leg swelling, prolonged immobilization, or recent surgery  Has been using father's inhaler with some improvement  Denies any wheezing  Has history of allergies in the past for which he has taken cetirizine prn  No new environmental triggers that he can take think of  Has pets, not new  Physical examination with tonsillectomy, no pharyngeal erythema or exudate, no maxillofacial ttp, no cervical or axillary LAD, lungs CTAB.  Pulse ox SpO2 97%  Suspect symptoms are due to allergies.  He agrees with plan to try OTC loratadine  If symptoms do not improve in 1 week, consider trialing Singulair, CBC with differential to look for eosinophils, and [facility]

## 2025-03-25 NOTE — PROGRESS NOTES
INTERNAL MEDICINE OFFICE VISIT       NAME: Jitendra Ibarra  AGE: 24 y.o. SEX: male       : 2000        MRN: [id number]    DATE: 3/25/2025  TIME: 10:39 AM    Assessment and Plan   1. Shortness of breath  Assessment & Plan:  Presenting with SOB for the past 4 days  With associated sore throat and nasal congestion  Denies fevers, chills, cough, sinus pain, headache, ear pain, recent sick contact, GI symptoms, lack of appetite  Denies any chest pain, palpitations, lightheadedness, orthopnea, long-distance travel, leg swelling, prolonged immobilization, or recent surgery  Has been using father's inhaler with some improvement  Denies any wheezing  Has history of allergies in the past for which he has taken cetirizine prn  No new environmental triggers that he can take think of  Has pets, not new  Physical examination with tonsillectomy, no pharyngeal erythema or exudate, no maxillofacial ttp, no cervical or axillary LAD, lungs CTAB.  Pulse ox SpO2 97%  Suspect symptoms are due to allergies.  He agrees with plan to try OTC loratadine  If symptoms do not improve in 1 week, consider trialing Singulair, CBC with differential to look for eosinophils, and [facility]        There are no Patient Instructions on file for this visit.      Chief Complaint   No chief complaint on file.      History of Present Illness   Jitendra Ibarra is a 24 y.o.-year-old male who presents with SOB for the past 4 days with associated sore throat and congestion.  He denies any fevers, chills, cough, sinus pain, headache, ear pain, recent sick contact, GI symptoms, or lack of appetite.  He denies any chest pain, palpitations, lightheadedness, orthopnea, long-distance travel, leg swelling, prolonged immobilization, or recent surgery.  He has been using his father's inhaler with some improvement.  However, he denies any wheezing.  He has history of allergies in the past for which he as been taking cetirizine as needed.  He denies any new  environmental triggers that he can think of.  He does have pets, however, they are not new.  He agrees with plan to try over-the-counter loratadine at this time.  Of note, he was last seen in the office on 1/31/2025 for left-sided chest wall pain secondary to MSK pains, which has since resolved.    Review of Systems   Review of Systems   Constitutional:  Negative for chills and fever.   HENT:  Positive for congestion and sore throat. Negative for ear pain.    Respiratory:  Positive for shortness of breath. Negative for cough and wheezing.    Cardiovascular:  Negative for chest pain, palpitations and leg swelling.   Gastrointestinal:  Negative for abdominal pain, constipation, diarrhea, nausea and vomiting.   Genitourinary:  Negative for dysuria and hematuria.   Musculoskeletal:  Negative for back pain and neck pain.   Skin:  Negative for rash and wound.   Neurological:  Negative for weakness, light-headedness, numbness and headaches.       Active Problem List     Patient Active Problem List   Diagnosis    Multiple lipomas    Shortness of breath       The following portions of the patient's history were reviewed and updated as appropriate: allergies, current medications, past family history, past medical history, past social history, past surgical history, and problem list.    Objective     Vitals:    03/25/25 0940   BP: 124/76   Pulse: 82   Temp: (!) 97.2 °F (36.2 °C)   SpO2: (!) 86%     Repeat SpO2 97%    Wt Readings from Last 3 Encounters:   03/25/25 96.6 kg (213 lb)   01/31/25 92.4 kg (203 lb 12.8 oz)   11/19/24 94.3 kg (208 lb)       Physical Exam  Constitutional:       General: He is not in acute distress.     Appearance: Normal appearance. He is normal weight. He is not ill-appearing or toxic-appearing.   HENT:      Head: Normocephalic and atraumatic.      Mouth/Throat:      Mouth: Mucous membranes are moist.      Pharynx: No oropharyngeal exudate or posterior oropharyngeal erythema.      Comments:  Tonsillectomy  Eyes:      Extraocular Movements: Extraocular movements intact.   Neck:      Comments: No cervical LAD  Cardiovascular:      Rate and Rhythm: Normal rate and regular rhythm.      Heart sounds: No murmur heard.  Pulmonary:      Effort: No respiratory distress.      Breath sounds: No wheezing, rhonchi or rales.   Abdominal:      General: Bowel sounds are normal. There is no distension.      Palpations: Abdomen is soft.      Tenderness: There is no abdominal tenderness. There is no guarding or rebound.   Musculoskeletal:         General: No swelling or tenderness. Normal range of motion.      Cervical back: Normal range of motion and neck supple.      Right lower leg: No edema.      Left lower leg: No edema.      Comments: No axillary LAD   Skin:     General: Skin is warm.      Capillary Refill: Capillary refill takes less than 2 seconds.      Findings: No rash.   Neurological:      General: No focal deficit present.      Mental Status: He is alert and oriented to person, place, and time. Mental status is at baseline.         Pertinent Laboratory/Diagnostic Studies:  I have reviewed pertinent labs:    No orders of the defined types were placed in this encounter.      ALLERGIES:  No Known Allergies    Current Medications     Current Outpatient Medications   Medication Sig Dispense Refill    hydrocortisone 2.5 % cream Apply 1 Application topically 4 (four) times a day as needed for rash       No current facility-administered medications for this visit.       Health Maintenance        Jordon Cobian DO

## 2025-07-11 ENCOUNTER — OFFICE VISIT (OUTPATIENT)
Age: 25
End: 2025-07-11
Payer: COMMERCIAL

## 2025-07-11 VITALS
HEIGHT: 74 IN | DIASTOLIC BLOOD PRESSURE: 82 MMHG | HEART RATE: 74 BPM | SYSTOLIC BLOOD PRESSURE: 126 MMHG | BODY MASS INDEX: 27.72 KG/M2 | OXYGEN SATURATION: 98 % | WEIGHT: 216 LBS | RESPIRATION RATE: 16 BRPM

## 2025-07-11 DIAGNOSIS — H10.9 CONJUNCTIVITIS OF BOTH EYES, UNSPECIFIED CONJUNCTIVITIS TYPE: Primary | ICD-10-CM

## 2025-07-11 PROBLEM — R06.02 SHORTNESS OF BREATH: Status: RESOLVED | Noted: 2025-03-25 | Resolved: 2025-07-11

## 2025-07-11 PROCEDURE — 99213 OFFICE O/P EST LOW 20 MIN: CPT | Performed by: INTERNAL MEDICINE

## 2025-07-11 RX ORDER — MOXIFLOXACIN 5 MG/ML
1 SOLUTION/ DROPS OPHTHALMIC 3 TIMES DAILY
Qty: 3 ML | Refills: 0 | Status: SHIPPED | OUTPATIENT
Start: 2025-07-11 | End: 2025-07-18

## 2025-07-11 RX ORDER — KETOROLAC TROMETHAMINE 4 MG/ML
1 SOLUTION/ DROPS OPHTHALMIC 4 TIMES DAILY
Qty: 5 ML | Refills: 0 | Status: SHIPPED | OUTPATIENT
Start: 2025-07-11 | End: 2025-07-15

## 2025-07-11 NOTE — PROGRESS NOTES
INTERNAL MEDICINE OFFICE VISIT       NAME: Jitendra Ibarra  AGE: 24 y.o. SEX: male       : 2000        MRN: [id number]    DATE: 2025  TIME: 9:06 AM    Assessment and Plan   1. Conjunctivitis of both eyes, unspecified conjunctivitis type (Primary)  also, start Naphcon-A ophthalmic solution as directed on a long-term basis.  - moxifloxacin (VIGAMOX) 0.5 % ophthalmic solution; Administer 1 drop to both eyes 3 (three) times a day for 7 days  Dispense: 3 mL; Refill: 0  - ketorolac (ACULAR) 0.4 % SOLN; Administer 1 drop to both eyes 4 (four) times a day for 4 days  Dispense: 5 mL; Refill: 0  - amoxicillin-clavulanate (AUGMENTIN) 875-125 mg per tablet; Take 1 tablet by mouth every 12 (twelve) hours for 7 days  Dispense: 14 tablet; Refill: 0    We discussed monitoring symptoms and expect improvement over the next 3 days.  If symptoms fail to respond to treatment or if they return after completing medication, I asked Jitendra to contact the office.  Further options would be referral to a new ophthalmologist as per his request.  Also, allergy testing would also be an order.    Chief Complaint   Persistent eye itching and redness    History of Present Illness   Jitendra Ibarra is a 24 y.o.-year-old male who is here today with his mother.  He was last seen in our office on  complaining of respiratory congestion, possibly allergic in nature.    Jitendra reports that in May, he developed eye itching and redness that was rather intense.  He had a telemedicine visit on May 22 for acute conjunctivitis and tobramycin ophthalmic solution was prescribed.  His symptoms temporarily improved, then returned.    He reports that he saw an ophthalmologist thereafter who prescribed prednisolone drops which completely resolved his symptoms, only to have them return.    Jitendra reports intense itching, constant eye rubbing and dryness of eyelids, intense redness of his eyes, and some crusted mucus at the corners of his eyes when he  wakes up each morning.  He denies nasal, sinus or ear congestion.  He denies postnasal drainage.    No new environmental exposures or review including no recent move, no new pets or any pets, and his routine is the same.  He does go to a local gym.    Discussed that history and exam are consistent with subacute conjunctivitis, with the possibility of recurrent infection, including secondary contamination from sinuses.    Alternatively, this could be an allergic condition.    Plan is to treat possible infection, as prescribed above, and to treat allergies with Naphcon-A.  Avoid antihistamines for now because of possibility of increased eye dryness.    For exfoliation and dryness of the eyelids from rubbing, and possibly from allergies, Lubriderm cream will be applied.    Review of Systems   Review of Systems as above    Active Problem List   Problem List[1]    The following portions of the patient's history were reviewed and updated as appropriate: allergies, current medications, past family history, past medical history, past social history, past surgical history, and problem list.    Objective     Vitals:    07/11/25 0836   BP: 126/82   Pulse: 74   Resp: 16   SpO2: 98%     Wt Readings from Last 3 Encounters:   07/11/25 98 kg (216 lb)   03/25/25 96.6 kg (213 lb)   01/31/25 92.4 kg (203 lb 12.8 oz)       Physical Exam    Alert, oriented in no acute distress.  Eyes are obviously red.  Ophthalmology exam: Visual acuity is superior.  Pupils are 3 mm with normal direct and consensual responses, extraocular eye moods are intact.  There is normal red reflex.  There is intense scleral and palpebral conjunctivitis, no iritis, no discharge from the eyes.  No congestion over the sinuses on exam of the face.  Nasal passage and throat normal.  No head or neck adenopathy.  There is dryness and exfoliation of skin of upper and lower eyelids bilaterally.  There is no rash.  There is no evidence of any periorbital  cellulitis.                ALLERGIES:  Allergies[2]    Current Medications   Current Medications[3]      Health Maintenance        Brandon Macdonald MD         [1]   Patient Active Problem List  Diagnosis    Multiple lipomas   [2] No Known Allergies  [3]   Current Outpatient Medications   Medication Sig Dispense Refill    amoxicillin-clavulanate (AUGMENTIN) 875-125 mg per tablet Take 1 tablet by mouth every 12 (twelve) hours for 7 days 14 tablet 0    ketorolac (ACULAR) 0.4 % SOLN Administer 1 drop to both eyes 4 (four) times a day for 4 days 5 mL 0    moxifloxacin (VIGAMOX) 0.5 % ophthalmic solution Administer 1 drop to both eyes 3 (three) times a day for 7 days 3 mL 0     No current facility-administered medications for this visit.

## 2025-08-05 ENCOUNTER — OFFICE VISIT (OUTPATIENT)
Age: 25
End: 2025-08-05
Payer: COMMERCIAL

## 2025-08-05 VITALS
HEIGHT: 74 IN | WEIGHT: 210 LBS | DIASTOLIC BLOOD PRESSURE: 88 MMHG | RESPIRATION RATE: 17 BRPM | SYSTOLIC BLOOD PRESSURE: 130 MMHG | OXYGEN SATURATION: 98 % | HEART RATE: 68 BPM | BODY MASS INDEX: 26.95 KG/M2

## 2025-08-05 DIAGNOSIS — Z00.00 ANNUAL PHYSICAL EXAM: Primary | ICD-10-CM

## 2025-08-05 DIAGNOSIS — Z13.220 SCREENING FOR HYPERLIPIDEMIA: ICD-10-CM

## 2025-08-05 DIAGNOSIS — Z13.0 SCREENING FOR DEFICIENCY ANEMIA: ICD-10-CM

## 2025-08-05 DIAGNOSIS — Z13.1 SCREENING FOR DIABETES MELLITUS: ICD-10-CM

## 2025-08-05 PROCEDURE — 99395 PREV VISIT EST AGE 18-39: CPT | Performed by: INTERNAL MEDICINE

## (undated) DEVICE — ANTI-FOG SOLUTION WITH FOAM PAD: Brand: DEVON

## (undated) DEVICE — WAND COBLATION  EVAC 70 XTRA TONSIL

## (undated) DEVICE — SYRINGE 10ML LL CONTROL TOP

## (undated) DEVICE — UTILITY MARKER,BLACK WITH LABELS: Brand: DEVON

## (undated) DEVICE — GLOVE INDICATOR PI UNDERGLOVE SZ 7.5 BLUE

## (undated) DEVICE — STERILE BETHLEHEM T AND A PACK: Brand: CARDINAL HEALTH

## (undated) DEVICE — PAD GROUNDING ADULT

## (undated) DEVICE — WAND COBLATION REFLEX ULTRA 45

## (undated) DEVICE — STERILE BETHLEHEM NASAL PACK: Brand: CARDINAL HEALTH

## (undated) DEVICE — NEURO PATTIES 1/2 X 3

## (undated) DEVICE — CATH URET 12FR RED RUBBER

## (undated) DEVICE — GLOVE SRG BIOGEL 7.5

## (undated) DEVICE — SPECIMEN CONTAINER STERILE PEEL PACK